# Patient Record
Sex: MALE | Race: WHITE | Employment: OTHER | URBAN - METROPOLITAN AREA
[De-identification: names, ages, dates, MRNs, and addresses within clinical notes are randomized per-mention and may not be internally consistent; named-entity substitution may affect disease eponyms.]

---
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
## 2021-03-24 ENCOUNTER — HOSPITAL ENCOUNTER (INPATIENT)
Age: 21
LOS: 6 days | Discharge: HOME | DRG: 885 | End: 2021-03-30
Attending: EMERGENCY MEDICINE | Admitting: PSYCHIATRY & NEUROLOGY
Payer: COMMERCIAL

## 2021-03-24 DIAGNOSIS — R45.851 SUICIDAL IDEATION: Primary | ICD-10-CM

## 2021-03-24 LAB
ACETAMINOPHEN LEVEL: <5 MCG/ML (ref 10–30)
ALBUMIN SERPL-MCNC: 4.7 G/DL (ref 3.5–5.2)
ALP BLD-CCNC: 113 U/L (ref 40–129)
ALT SERPL-CCNC: 46 U/L (ref 0–40)
AMPHETAMINE SCREEN, URINE: NOT DETECTED
ANION GAP SERPL CALCULATED.3IONS-SCNC: 11 MMOL/L (ref 7–16)
AST SERPL-CCNC: 38 U/L (ref 0–39)
BARBITURATE SCREEN URINE: NOT DETECTED
BASOPHILS ABSOLUTE: 0.03 E9/L (ref 0–0.2)
BASOPHILS RELATIVE PERCENT: 0.4 % (ref 0–2)
BENZODIAZEPINE SCREEN, URINE: NOT DETECTED
BILIRUB SERPL-MCNC: 0.3 MG/DL (ref 0–1.2)
BILIRUBIN URINE: NEGATIVE
BLOOD, URINE: NEGATIVE
BUN BLDV-MCNC: 8 MG/DL (ref 6–20)
CALCIUM SERPL-MCNC: 9.5 MG/DL (ref 8.6–10.2)
CANNABINOID SCREEN URINE: NOT DETECTED
CHLORIDE BLD-SCNC: 105 MMOL/L (ref 98–107)
CLARITY: CLEAR
CO2: 24 MMOL/L (ref 22–29)
COCAINE METABOLITE SCREEN URINE: NOT DETECTED
COLOR: YELLOW
CREAT SERPL-MCNC: 0.8 MG/DL (ref 0.7–1.2)
EOSINOPHILS ABSOLUTE: 0.19 E9/L (ref 0.05–0.5)
EOSINOPHILS RELATIVE PERCENT: 2.6 % (ref 0–6)
ETHANOL: <10 MG/DL (ref 0–0.08)
FENTANYL SCREEN, URINE: NOT DETECTED
GFR AFRICAN AMERICAN: >60
GFR NON-AFRICAN AMERICAN: >60 ML/MIN/1.73
GLUCOSE BLD-MCNC: 97 MG/DL (ref 74–99)
GLUCOSE URINE: NEGATIVE MG/DL
HCT VFR BLD CALC: 42.7 % (ref 37–54)
HEMOGLOBIN: 13.7 G/DL (ref 12.5–16.5)
IMMATURE GRANULOCYTES #: 0.03 E9/L
IMMATURE GRANULOCYTES %: 0.4 % (ref 0–5)
KETONES, URINE: NEGATIVE MG/DL
LEUKOCYTE ESTERASE, URINE: NEGATIVE
LYMPHOCYTES ABSOLUTE: 1.96 E9/L (ref 1.5–4)
LYMPHOCYTES RELATIVE PERCENT: 26.5 % (ref 20–42)
Lab: NORMAL
MCH RBC QN AUTO: 27 PG (ref 26–35)
MCHC RBC AUTO-ENTMCNC: 32.1 % (ref 32–34.5)
MCV RBC AUTO: 84.2 FL (ref 80–99.9)
METHADONE SCREEN, URINE: NOT DETECTED
MONOCYTES ABSOLUTE: 0.8 E9/L (ref 0.1–0.95)
MONOCYTES RELATIVE PERCENT: 10.8 % (ref 2–12)
NEUTROPHILS ABSOLUTE: 4.39 E9/L (ref 1.8–7.3)
NEUTROPHILS RELATIVE PERCENT: 59.3 % (ref 43–80)
NITRITE, URINE: NEGATIVE
OPIATE SCREEN URINE: NOT DETECTED
OXYCODONE URINE: NOT DETECTED
PDW BLD-RTO: 12.5 FL (ref 11.5–15)
PH UA: 7 (ref 5–9)
PHENCYCLIDINE SCREEN URINE: NOT DETECTED
PLATELET # BLD: 258 E9/L (ref 130–450)
PMV BLD AUTO: 9.6 FL (ref 7–12)
POTASSIUM SERPL-SCNC: 4.2 MMOL/L (ref 3.5–5)
PROTEIN UA: NEGATIVE MG/DL
RBC # BLD: 5.07 E12/L (ref 3.8–5.8)
SALICYLATE, SERUM: <0.3 MG/DL (ref 0–30)
SARS-COV-2, NAAT: NOT DETECTED
SODIUM BLD-SCNC: 140 MMOL/L (ref 132–146)
SPECIFIC GRAVITY UA: 1.01 (ref 1–1.03)
T4 TOTAL: 5.5 MCG/DL (ref 4.5–11.7)
TOTAL PROTEIN: 7.3 G/DL (ref 6.4–8.3)
TRICYCLIC ANTIDEPRESSANTS SCREEN SERUM: NEGATIVE NG/ML
TSH SERPL DL<=0.05 MIU/L-ACNC: 1.7 UIU/ML (ref 0.27–4.2)
UROBILINOGEN, URINE: 0.2 E.U./DL
WBC # BLD: 7.4 E9/L (ref 4.5–11.5)

## 2021-03-24 PROCEDURE — 1240000000 HC EMOTIONAL WELLNESS R&B

## 2021-03-24 PROCEDURE — 99284 EMERGENCY DEPT VISIT MOD MDM: CPT

## 2021-03-24 PROCEDURE — 84436 ASSAY OF TOTAL THYROXINE: CPT

## 2021-03-24 PROCEDURE — 80143 DRUG ASSAY ACETAMINOPHEN: CPT

## 2021-03-24 PROCEDURE — 80053 COMPREHEN METABOLIC PANEL: CPT

## 2021-03-24 PROCEDURE — 82077 ASSAY SPEC XCP UR&BREATH IA: CPT

## 2021-03-24 PROCEDURE — 87635 SARS-COV-2 COVID-19 AMP PRB: CPT

## 2021-03-24 PROCEDURE — 80179 DRUG ASSAY SALICYLATE: CPT

## 2021-03-24 PROCEDURE — 80307 DRUG TEST PRSMV CHEM ANLYZR: CPT

## 2021-03-24 PROCEDURE — 85025 COMPLETE CBC W/AUTO DIFF WBC: CPT

## 2021-03-24 PROCEDURE — 84443 ASSAY THYROID STIM HORMONE: CPT

## 2021-03-24 PROCEDURE — 93005 ELECTROCARDIOGRAM TRACING: CPT | Performed by: EMERGENCY MEDICINE

## 2021-03-24 PROCEDURE — 81003 URINALYSIS AUTO W/O SCOPE: CPT

## 2021-03-24 RX ORDER — HALOPERIDOL 5 MG/ML
5 INJECTION INTRAMUSCULAR EVERY 6 HOURS PRN
Status: DISCONTINUED | OUTPATIENT
Start: 2021-03-24 | End: 2021-03-30 | Stop reason: HOSPADM

## 2021-03-24 RX ORDER — HYDROXYZINE PAMOATE 50 MG/1
50 CAPSULE ORAL 3 TIMES DAILY PRN
Status: DISCONTINUED | OUTPATIENT
Start: 2021-03-24 | End: 2021-03-30 | Stop reason: HOSPADM

## 2021-03-24 RX ORDER — MAGNESIUM HYDROXIDE/ALUMINUM HYDROXICE/SIMETHICONE 120; 1200; 1200 MG/30ML; MG/30ML; MG/30ML
30 SUSPENSION ORAL PRN
Status: DISCONTINUED | OUTPATIENT
Start: 2021-03-24 | End: 2021-03-30 | Stop reason: HOSPADM

## 2021-03-24 RX ORDER — ACETAMINOPHEN 325 MG/1
650 TABLET ORAL EVERY 6 HOURS PRN
Status: DISCONTINUED | OUTPATIENT
Start: 2021-03-24 | End: 2021-03-30 | Stop reason: HOSPADM

## 2021-03-24 RX ORDER — TRAZODONE HYDROCHLORIDE 50 MG/1
50 TABLET ORAL NIGHTLY PRN
Status: DISCONTINUED | OUTPATIENT
Start: 2021-03-25 | End: 2021-03-30 | Stop reason: HOSPADM

## 2021-03-24 RX ORDER — HALOPERIDOL 5 MG
5 TABLET ORAL EVERY 6 HOURS PRN
Status: DISCONTINUED | OUTPATIENT
Start: 2021-03-24 | End: 2021-03-30 | Stop reason: HOSPADM

## 2021-03-24 ASSESSMENT — ENCOUNTER SYMPTOMS
VOMITING: 0
TROUBLE SWALLOWING: 0
SHORTNESS OF BREATH: 0
NAUSEA: 0
BLOOD IN STOOL: 0
DIARRHEA: 0
COUGH: 0
RHINORRHEA: 0
COLOR CHANGE: 0
ABDOMINAL PAIN: 0

## 2021-03-24 NOTE — ED NOTES
Bed: [facility]27A  Expected date:   Expected time:   Means of arrival: [facility]  Comments:  Rob Dunlap, [address]  03/24/21 0424

## 2021-03-24 NOTE — ED PROVIDER NOTES
ED PROVIDER NOTE    Chief Complaint   Patient presents with    Suicidal     SI with a plan to cut his wrist. Pt states he has the mind of a 2year old and no one understands him. HPI:  3/24/21,   Time: 5:15 PM EDT       Naseem Rosas is a 24 y.o. male presenting to the ED for suicidal ideation. Acute onset 1h ago after friend told him to go kill himself. Moderate in severity. Improving since onset but still present. Plan to cut wrists. No associated HI/AVH. No recent illness or injury. No drug/etoh use. Compliant with home psych meds. Chart review: hx of autism, OCD    Review of Systems:     Review of Systems   Constitutional: Negative for appetite change, chills and fever. HENT: Negative for congestion, rhinorrhea and trouble swallowing. Eyes: Negative for visual disturbance. Respiratory: Negative for cough and shortness of breath. Cardiovascular: Negative for chest pain and leg swelling. Gastrointestinal: Negative for abdominal pain, blood in stool, diarrhea, nausea and vomiting. Genitourinary: Negative for decreased urine volume, difficulty urinating, dysuria, frequency, hematuria and urgency. Musculoskeletal: Negative for myalgias, neck pain and neck stiffness. Skin: Negative for color change. Neurological: Negative for dizziness, syncope, weakness, light-headedness, numbness and headaches. Psychiatric/Behavioral: Positive for dysphoric mood and suicidal ideas. Negative for hallucinations, self-injury and sleep disturbance. --------------------------------------------- PAST HISTORY ---------------------------------------------  Past Medical History:   Past Medical History:   Diagnosis Date    Autism     OCD (obsessive compulsive disorder)        Past Surgical History:   No past surgical history on file.     Social History:   Social History     Socioeconomic History    Marital status: Single     Spouse name: Not on file    Number of children: Not on file    Years of education: Not on file    Highest education level: Not on file   Occupational History    Not on file   Social Needs    Financial resource strain: Not on file    Food insecurity     Worry: Not on file     Inability: Not on file    Transportation needs     Medical: Not on file     Non-medical: Not on file   Tobacco Use    Smoking status: Not on file   Substance and Sexual Activity    Alcohol use: Not on file    Drug use: Not on file    Sexual activity: Not on file   Lifestyle    Physical activity     Days per week: Not on file     Minutes per session: Not on file    Stress: Not on file   Relationships    Social connections     Talks on phone: Not on file     Gets together: Not on file     Attends [employer] service: Not on file     Active member of club or organization: Not on file     Attends meetings of clubs or organizations: Not on file     Relationship status: Not on file    Intimate partner violence     Fear of current or ex partner: Not on file     Emotionally abused: Not on file     Physically abused: Not on file     Forced sexual activity: Not on file   Other Topics Concern    Not on file   Social History Narrative    Not on file       Family History:   No family history on file. The patients home medications have been reviewed. Allergies:   No Known Allergies        ---------------------------------------------------PHYSICAL EXAM--------------------------------------    BP (!) 154/63   Pulse 88   Temp 98.4 °F (36.9 °C) (Temporal)   Resp 17   Wt 128 lb (58.1 kg)   SpO2 100%     Physical Exam  Vitals signs and nursing note reviewed. Constitutional:       General: He is not in acute distress. Appearance: He is not toxic-appearing. HENT:      Mouth/Throat:      Mouth: Mucous membranes are moist.   Eyes:      General: No scleral icterus. Extraocular Movements: Extraocular movements intact. Pupils: Pupils are equal, round, and reactive to light.    Neck: Musculoskeletal: Normal range of motion and neck supple. No neck rigidity or muscular tenderness. Cardiovascular:      Rate and Rhythm: Normal rate and regular rhythm. Pulses: Normal pulses. Heart sounds: Normal heart sounds. No murmur. Pulmonary:      Effort: Pulmonary effort is normal. No respiratory distress. Breath sounds: Normal breath sounds. No wheezing or rales. Abdominal:      General: There is no distension. Palpations: Abdomen is soft. Tenderness: There is no abdominal tenderness. There is no guarding or rebound. Musculoskeletal: Normal range of motion. General: No swelling or tenderness. Skin:     General: Skin is warm and dry. Neurological:      Mental Status: He is alert and oriented to person, place, and time. Comments: Strength 5/5 and sensation grossly intact to light touch and equal bilaterally throughout all extremities   Psychiatric:      Comments: Normal affect, calm, cooperative, appropriate, +SI, no HI/AVH, not responding to internal stimuli            -------------------------------------------------- RESULTS -------------------------------------------------  I have personally reviewed all laboratory and imaging results for this patient. Results are listed below. LABS:  Labs Reviewed   COMPREHENSIVE METABOLIC PANEL - Abnormal; Notable for the following components:       Result Value    ALT 46 (*)     All other components within normal limits   SERUM DRUG SCREEN - Abnormal; Notable for the following components:    Acetaminophen Level <5.0 (*)     All other components within normal limits   COVID-19, RAPID   CBC WITH AUTO DIFFERENTIAL   URINE DRUG SCREEN   URINALYSIS   TSH WITHOUT REFLEX   T4       EKG: This EKG is signed and interpreted by the EP.     Normal sinus rhythm, vent rate 74bpm, normal axis and intervals, no acute injury pattern, no prior EKG on file for comparison      ------------------------- NURSING NOTES AND VITALS REVIEWED ---------------------------   The nursing notes within the ED encounter and vital signs as below have been reviewed by myself. BP (!) 154/63   Pulse 88   Temp 98.4 °F (36.9 °C) (Temporal)   Resp 17   Wt 128 lb (58.1 kg)   SpO2 100%   Oxygen Saturation Interpretation: Normal    The patients available past medical records and past encounters were reviewed. ------------------------------ ED COURSE/MEDICAL DECISION MAKING----------------------  Medications   acetaminophen (TYLENOL) tablet 650 mg (has no administration in time range)   magnesium hydroxide (MILK OF MAGNESIA) 400 MG/5ML suspension 30 mL (has no administration in time range)   aluminum & magnesium hydroxide-simethicone (MAALOX) 200-200-20 MG/5ML suspension 30 mL (has no administration in time range)   hydrOXYzine (VISTARIL) capsule 50 mg (50 mg Oral Given 3/25/21 0202)   haloperidol lactate (HALDOL) injection 5 mg (has no administration in time range)     Or   haloperidol (HALDOL) tablet 5 mg (has no administration in time range)   traZODone (DESYREL) tablet 50 mg (has no administration in time range)     Consultations:             Social work; psychiatry    Counseling: The emergency provider has spoken with the patient and discussed todays results, in addition to providing specific details for the plan of care and counseling regarding the diagnosis and prognosis. Questions are answered at this time and they are agreeable with the plan. ED Course/Medical Decision Makin y.o. male here with suicidal ideation. Medically cleared for psych evaluation. Calm and cooperative throughout ED course. Persistent SI on reevaluation. [place] slipped and admitted to psychiatry for further management.       --------------------------------- IMPRESSION AND DISPOSITION ---------------------------------    IMPRESSION  1.  Suicidal ideation        DISPOSITION  Disposition: Admit to mental health unit - medically cleared for admission  Patient condition is stable    NOTE: This report was transcribed using voice recognition software.  Every effort was made to ensure accuracy; however, inadvertent computerized transcription errors may be present    Abida Johnson MD  Attending Emergency Physician          Abida Johnson MD  03/25/21 9723

## 2021-03-25 PROBLEM — F33.2 SEVERE EPISODE OF RECURRENT MAJOR DEPRESSIVE DISORDER, WITHOUT PSYCHOTIC FEATURES (HCC): Status: ACTIVE | Noted: 2021-03-25

## 2021-03-25 LAB
CHOLESTEROL, TOTAL: 110 MG/DL (ref 0–199)
EKG ATRIAL RATE: 74 BPM
EKG P AXIS: 51 DEGREES
EKG P-R INTERVAL: 128 MS
EKG Q-T INTERVAL: 350 MS
EKG QRS DURATION: 92 MS
EKG QTC CALCULATION (BAZETT): 388 MS
EKG R AXIS: 85 DEGREES
EKG T AXIS: 57 DEGREES
EKG VENTRICULAR RATE: 74 BPM
HBA1C MFR BLD: 5.4 % (ref 4–5.6)
HDLC SERPL-MCNC: 34 MG/DL
LDL CHOLESTEROL CALCULATED: 55 MG/DL (ref 0–99)
TRIGL SERPL-MCNC: 107 MG/DL (ref 0–149)
VLDLC SERPL CALC-MCNC: 21 MG/DL

## 2021-03-25 PROCEDURE — 80061 LIPID PANEL: CPT

## 2021-03-25 PROCEDURE — 36415 COLL VENOUS BLD VENIPUNCTURE: CPT

## 2021-03-25 PROCEDURE — 93010 ELECTROCARDIOGRAM REPORT: CPT | Performed by: INTERNAL MEDICINE

## 2021-03-25 PROCEDURE — 1240000000 HC EMOTIONAL WELLNESS R&B

## 2021-03-25 PROCEDURE — 83036 HEMOGLOBIN GLYCOSYLATED A1C: CPT

## 2021-03-25 PROCEDURE — 6370000000 HC RX 637 (ALT 250 FOR IP): Performed by: EMERGENCY MEDICINE

## 2021-03-25 PROCEDURE — 99222 1ST HOSP IP/OBS MODERATE 55: CPT | Performed by: NURSE PRACTITIONER

## 2021-03-25 RX ADMIN — HYDROXYZINE PAMOATE 50 MG: 50 CAPSULE ORAL at 02:02

## 2021-03-25 RX ADMIN — HYDROXYZINE PAMOATE 50 MG: 50 CAPSULE ORAL at 13:27

## 2021-03-25 ASSESSMENT — LIFESTYLE VARIABLES
HISTORY_ALCOHOL_USE: YES
HISTORY_ALCOHOL_USE: NO

## 2021-03-25 ASSESSMENT — PATIENT HEALTH QUESTIONNAIRE - PHQ9
SUM OF ALL RESPONSES TO PHQ QUESTIONS 1-9: 14
SUM OF ALL RESPONSES TO PHQ QUESTIONS 1-9: 8

## 2021-03-25 ASSESSMENT — SLEEP AND FATIGUE QUESTIONNAIRES
DO YOU HAVE DIFFICULTY SLEEPING: NO
AVERAGE NUMBER OF SLEEP HOURS: 7
AVERAGE NUMBER OF SLEEP HOURS: 8

## 2021-03-25 ASSESSMENT — PAIN SCALES - GENERAL
PAINLEVEL_OUTOF10: 0
PAINLEVEL_OUTOF10: 0

## 2021-03-25 NOTE — PLAN OF CARE
Problem: Depressive Behavior With or Without Suicide Precautions:  Goal: Able to verbalize acceptance of life and situations over which he or she has no control  Description: Able to verbalize acceptance of life and situations over which he or she has no control  3/25/2021 1107 by Edinson Wiseman RN  Outcome: Ongoing     Problem: Depressive Behavior With or Without Suicide Precautions:  Goal: Able to verbalize and/or display a decrease in depressive symptoms  Description: Able to verbalize and/or display a decrease in depressive symptoms  3/25/2021 1107 by Edinson Wiseman RN  Outcome: Ongoing     Problem: Depressive Behavior With or Without Suicide Precautions:  Goal: Ability to disclose and discuss suicidal ideas will improve  Description: Ability to disclose and discuss suicidal ideas will improve  3/25/2021 1107 by Edinson Wiseman RN  Outcome: Ongoing     Problem: Depressive Behavior With or Without Suicide Precautions:  Goal: Able to verbalize support systems  Description: Able to verbalize support systems  3/25/2021 1107 by Edinson Wiseman RN  Outcome: Ongoing     Problem: Depressive Behavior With or Without Suicide Precautions:  Goal: Absence of self-harm  Description: Absence of self-harm  3/25/2021 1107 by Edinson Wiseman RN  Outcome: Ongoing     Patient is pleasant and cooperative. Eye contact is poor. Patient denies homicidal ideations and hallucinations. Patient endorses suicidal ideations with no plan. Patient contracts for safety. Patient is isolative to self. He is in control of his behavior. Will continue to monitor and support.

## 2021-03-25 NOTE — CARE COORDINATION
Biopsychosocial Assessment Note    Social work met with patient to complete the biopsychosocial assessment and CSSR-S. Mental Status Exam: Oriented x4. Facial expressions brightened, affect congruent, alert, mood euphoric, motor activity repetitive acts, interview behavior cooperative, attention distractible, thought processes tangential, thought content preoccupations, poor remote, poor insight and judgement. Pt is a poor historian. Pt confirms SI. Pt denied HI. Pt reported seeing a shadow once in abandoned hospital, but not at present. Chief Complaint: Robert Mckinney is a 25 yo male who presents via ambulance, pt is not on a pink slip, reports he got into an argument with his sister who told him to go kill himself and he was so hurt and angered by this that he determined that he would kill himself by cutting his wrists. \"    Patient Report: Pt reports getting into an argument with his friend who told Pt to kill himself. Pt had a knife and was going to cut his wrists for purposes of killing himself, when another friend intervened and took knife from Pt and told him to call 911 which Pt did. Pt current reports suicidal thoughts, but no plan at present or intention of acting on thoughts. Pt reports previous suicidal thoughts on February 24 when Pt wanted to jump in front of rail road tracks and went to [facility]. Pt further reported he attempted suicide 2 years ago with intentions of stabbing himself with a knife. A friend called and Pt stopped. Pt reports superficial cut. Pt reported drinking every other year, yearly, and when he was bored/thristy only happening ever year. Pt reported drinking one bottle of vodka when drinking, but later reported drinking drinks with only 1 percent alcohol. Pt denies AVH, but reported seeing a shadow at an abandoned hospital [place] years ago. Pt reports his adopted parents kicked him out of the [facility] for [employer] and is currently homeless. Gender  [x] Male [] Female [] Transgender  [] Other    Sexual Orientation    [x] Heterosexual [] Homosexual [] Bisexual [] Other    Suicidal Ideation  [x] Reports [] Denies  Pt reports SI at present, but does not have plan or intent. Homicidal Ideation  [] Reports [x] Denies      Hallucinations/Delusions (Specify type)  [] Reports [x] Denies   Pt denies AVH, but reported seeing a shadow at an abandoned hospital in [place] years ago. Substance Use/Alcohol Use/Addiction  [] Reports [x] Denies   Pt was poor historian. Pt reported drinking every other year, yearly, and when he was bored/thristy only happening ever year. Pt reported drinking one bottle of vodka when drinking, but later reported drinking drinks with only 1 percent alcohol. Collateral reported marijuana use in unknown amounts. Trauma History  [x] Reports [] Denies   Pt reports witnessing his adopted mother completing suicide through overdose of pills when Pt was younger. Collateral Contact (AUSTEN signed)  Name:  Cristal Garvin  Relationship: Friend  Number: [phone]    Collateral Information: Juan Diego Jones reports that friend of theirs told Pt to kill self and Pt was thinking about the statement. She did not think Pt attempted suicide, but she told him to go to the hospital. She reported Pt walked to the hospital. She reported Pt had frequent suicidal thoughts, but has never attempted. She reported Pt may have missed taking his medications on occation, but believed Pt took medications regularly. She was unsure of exact substance use, but reported Pt would drink alcohol \"not often\" and typically a \"shot of something. \" She further reported Pt would smoke marijuana \"not often anymore\" in unknown amounts. She reported that Pt is current homeless living on the streets confirming his family does not want anything to do with Pt, believing since death of adopted mother, the adopted father did not want him.      Access to Weapons per Collateral Contact: [] Reports [x] Denies   Pt reports he does not have access to weapons, but spoke about thinking about getting a switch blade for protection. Buffy Mcclelland denied access to weapons. Follow up provider preference: Reports Dr. SALINASMIVALENTE [facility] set up appointments, but did not remember were or when. Plan for discharge (where they live can they return):  To be determined

## 2021-03-25 NOTE — H&P
Department of Psychiatry  History and Physical - Adult     CHIEF COMPLAINT:  \"I am feeling suicidal.\"     Patient was seen after discussing with the treatment team and reviewing the chart    CIRCUMSTANCES OF ADMISSION:   Pt is a 23 yo male who presents via ambulance, pt is not on a pink slip, reports he got into an argument with his sister who told him to go kill himself and he was so hurt and angered by this that he determined that he would kill himself by cutting his wrists. Reports he has been dealing with depression and anxiety for past 7-10 days and has been thinking about suicide and after argument today he became convinced this was the answer. SW explored level of suicidal ideation and intent, when pressed pt becomes loud and insistent: \"I WILL kill myself. \" Apparent for interview that pt deals with cognitive issues which he reports as ADHD. HISTORY OF PRESENT ILLNESS:      The patient is a 24 y.o. male with significant past history of one inpatient psychiatric hospitalization in February 2021 at [facility] for depression and suicidal ideation ADHD and autism. Upon assessment today the patient continues to endorse suicidal ideation. He has a nonspecific plan. Tells me that he had a disagreement with a friend who said some hurtful things to him and made him want to kill himself, he does not want to hurt anyone else. He states that he still feels this way. He currently denies auditory or visual hallucinations, there are no overt or covert signs of psychosis or paranoia. Patient is alert and oriented to person place time and situation he is able to easily recount events leading to his hospitalization. States that he is now homeless and has nowhere to go. Tells me that he does not have a good relationship with his parents. He states that he receives disability, but is also looking for a job so that he has enough money to live off of.   He is expressing feelings of helplessness and person place time and situation and easily able to recount events leading to his hospitalization. Past Medical History:        Diagnosis Date    Autism     OCD (obsessive compulsive disorder)        Medications Prior to Admission:   Medications Prior to Admission: lisdexamfetamine (VYVANSE) 70 MG capsule, Take 70 mg by mouth every morning. Past Surgical History:    No past surgical history on file. Allergies:   Patient has no known allergies. Family History  No family history on file. EXAMINATION:    REVIEW OF SYSTEMS:    ROS:  [x] All negative/unchanged except if checked.  Explain positive(checked items) below:  [] Constitutional  [] Eyes  [] Ear/Nose/Mouth/Throat  [] Respiratory  [] CV  [] GI  []   [] Musculoskeletal  [] Skin/Breast  [] Neurological  [] Endocrine  [] Heme/Lymph  [] Allergic/Immunologic    Explanation:     Vitals:  BP (!) 117/59   Pulse 78   Temp 98.5 °F (36.9 °C) (Temporal)   Resp 18   Ht 5' 4\" (1.626 m)   Wt 130 lb (59 kg)   SpO2 99%   BMI 22.31 kg/m²      Physical Examination:   Head: x  Atraumatic: x normocephalic  Skin and Mucosa        Moist x  Dry   Pale  x Normal   Neck:  Thyroid  Palpable   x  Not palpable   venus distention   adenopathy   Chest: x Clear   Rhonchi     Wheezing   CV:  xS1   xS2    xNo murmer   Abdomen:  x  Soft    Tender    Viceromegaly   Extremities:  x No Edema     Edema     Cranial Nerves Examination:   CN II:   xPupils are reactive to light  Pupils are non reactive to light  CN III, IV, VI:  xNo eye deviation    No diplopia or ptosis   CN V:    xFacial Sensation is intact     Facial Sensation is not intact   CN IIIV:   x Hearing is normal to rubbing fingers   CN IX, X:     xNormal gag reflex and phonation   CN XI:   xShoulder shrug and neck rotation is normal  CNXII:    xTongue is midline no deviation or atrophy      DIAGNOSIS:    Severe episode of recurrent major depressive disorder, without psychotic features, mixed(HCC    LABS: REVIEWED

## 2021-03-25 NOTE — PLAN OF CARE
Problem: Suicide risk  Goal: Provide patient with safe environment  Description: Provide patient with safe environment  Outcome: Met This Shift     Problem: Depressive Behavior With or Without Suicide Precautions:  Goal: Able to verbalize acceptance of life and situations over which he or she has no control  Description: Able to verbalize acceptance of life and situations over which he or she has no control  Outcome: Ongoing  Goal: Able to verbalize and/or display a decrease in depressive symptoms  Description: Able to verbalize and/or display a decrease in depressive symptoms  Outcome: Ongoing  Goal: Ability to disclose and discuss suicidal ideas will improve  Description: Ability to disclose and discuss suicidal ideas will improve  Outcome: Ongoing  Goal: Able to verbalize support systems  Description: Able to verbalize support systems  Outcome: Ongoing  Goal: Absence of self-harm  Description: Absence of self-harm  Outcome: Ongoing  Goal: Participates in care planning  Description: Participates in care planning  Outcome: Ongoing

## 2021-03-25 NOTE — PROGRESS NOTES
`[facility]  Admission Note    Patient is a 24 y.o. male admitted from the [facility], he is pleasant and cooperative. Patient states he was in an argument with a friend and she told him to go kill himself. Patient reports he then felt suicidal with a plan to cut his wrists, he called 911 for help and was brought to the ER. Patient is sad and worried, he states he was recently laid off and evicted from his apartment, he has been staying with friends. Patient contracts for safety, states, \"I feel better here knowing that I will be checked on and I can get help. \" Denies HI/AVH. Patient reports he is autistic, has ADHD, and OCD. Patient reports he was prescribed vyvanse and has not taken it in at least 3 months. Admission Type:   Admission Type: Involuntary    Reason for admission:  Reason for Admission: \"My friend told me to kill myself and I was thinking about what my family has done to me. \"    PATIENT STRENGTHS:  Strengths: Communication, Motivated, Positive Support    Patient Strengths and Limitations:  Limitations: Tendency to isolate self    Addictive Behavior:   Addictive Behavior  In the past 3 months, have you felt or has someone told you that you have a problem with:  : None  Do you have a history of Chemical Use?: No  Do you have a history of Alcohol Use?: No  Do you have a history of Street Drug Abuse?: No  Histroy of Prescripton Drug Abuse?: No    Medical Problems:   Past Medical History:   Diagnosis Date    Autism     OCD (obsessive compulsive disorder)        Status EXAM:  Status and Exam  Normal: No  Facial Expression: Sad, Worried  Affect: Congruent  Level of Consciousness: Alert  Mood:Normal: No  Mood: Depressed, Anxious, Sad  Motor Activity:Normal: Yes  Interview Behavior: Cooperative  Preception: [place] to Person, [place] to Time, [place] to Place, [place] to Situation  Attention:Normal: No  Attention: Distractible  Thought Processes: Tangential  Thought Content:Normal: No  Thought Content: Preoccupations  Hallucinations: None  Delusions: No  Memory:Normal: Yes  Insight and Judgment: No  Insight and Judgment: Poor Judgment, Poor Insight  Present Suicidal Ideation: Yes  Present Homicidal Ideation: No    Tobacco Screening:  Practical Counseling, on admission, desiree X, if applicable and completed (first 3 are required if patient doesn't refuse): (x )  Recognizing danger situations (included triggers and roadblocks)                    ( x)  Coping skills (new ways to manage stress, exercise, relaxation techniques, changing routine, distraction)                                                           ( x)  Basic information about quitting (benefits of quitting, techniques in how to quit, available resources  ( ) Referral for counseling faxed to [facility]                                           ( ) Patient refused counseling  ( ) Patient has not smoked in the last 30 days    Metabolic Screening:    No results found for: LABA1C    No results found for: CHOL  No results found for: TRIG  No results found for: HDL  No components found for: LDLCAL  No results found for: LABVLDL      Body mass index is 22.31 kg/m². BP Readings from Last 2 Encounters:   03/25/21 (!) 117/59   01/03/20 110/68           Pt admitted with followings belongings:  Dentures: None  Vision - Corrective Lenses: None  Hearing Aid: None  Jewelry: None  Clothing: Shirt, Pants, Socks, Footwear  Were All Patient Medications Collected?: Not Applicable  Other Valuables: Cell phone     Valuables sent home with n/a. Valuables placed in safe in security envelope, number:  n/a. Patient's home medications were n/a. Patient oriented to surroundings and program expectations and copy of patient rights given. Received admission packet:  yes. Consents reviewed, signed yes. Patient verbalize understanding:  yes. Patient education on precautions: yes.                   Toi Martinez RN

## 2021-03-25 NOTE — GROUP NOTE
Group Therapy Note    Date: 3/25/2021    Group Start Time: 1350  Group End Time: 8555  Group Topic: Cognitive Skills    [facility] 2    [address]        Group Therapy Note    Attendees: 8         Patient's Goal:  To gain insight into the Grieving Process by interacting with the group    Notes: Pt was left group home though the session, but was able to express feelings of expressing grief in healthy ways and when to seek professional help. Pt was given support and feedback. Status After Intervention:  Improved    Participation Level:  Active Listener    Participation Quality: Sharing      Speech:  normal      Thought Process/Content: Linear      Affective Functioning: Exaggerated      Mood: euphoric      Level of consciousness:  Alert      Response to Learning: Able to retain information      Endings: None Reported    Modes of Intervention: Education      Discipline Responsible: /Counselor      Signature:  [address]

## 2021-03-25 NOTE — ED NOTES
Emergency Department [facility] OF [facility] Biopsychosocial Assessment Note    Chief Complaint: Pt is a 25 yo male who presents via ambulance, pt is not on a pink slip, reports he got into an argument with his sister who told him to go kill himself and he was so hurt and angered by this that he determined that he would kill himself by cutting his wrists. Reports he has been dealing with depression and anxiety for past 7-10 days and has been thinking about suicide and after argument today he became convinced this was the answer. SW explored level of suicidal ideation and intent, when pressed pt becomes loud and insistent: \"I WILL kill myself. \" Apparent for interview that pt deals with cognitive issues which he reports as ADHD. MSE: Alert, oriented x4, mood neutral, affect appropriate, congruent, behavior is cooperative, calm, no signs of agitation, thought form concrete, thought content clear, denies aud/visual hallucinations, delusions or paranoia, one noted in interview. Clinical Summary/History: Reports not open in community for services, reports a recent psych admission at [facility] [place], states he was prescribed Abilify from [facility], denies hospitalizations prior to 18. Gender  [x] Male [] Female [] Transgender  [] Other    Sexual Orientation    [x] Heterosexual [] Homosexual [] Bisexual [] Other    Suicidal Behavioral: CSSR-S Complete. [x] Reports:    [x] Past [x] Present   [] Denies    Homicidal/ Violent Behavior  [] Reports:   [] Past [] Present   [x] Denies     Hallucinations/Delusions   [] Reports:   [x] Denies     Substance Use/Alcohol Use/Addiction: SBIRT Screen Complete. [] Reports:   [x] Denies     Trauma History  [] Reports:  [x] Denies     Collateral Information:       Level of Care/Disposition Plan: Discussed with Dr Gigi Phelps, pt insistent on suicidal ideation with plan to cut wrists. In-patient for safety and assessment.    [] Home:   [] Outpatient Provider:   [] Crisis Unit:   [x] Inpatient Psychiatric Unit:  [] Other:        Keyla Young, MSLASHAY, LSW  03/24/21 [address] [facility] [employer], [employer]  03/24/21 [address] [place] [address] [facility] [employer], [employer]  03/24/21 [facility] [employer], [employer]  03/24/21 0048

## 2021-03-25 NOTE — PROGRESS NOTES
[facility]  Initial Interdisciplinary Treatment Plan NOTE    Review Date & Time: 3/25/2021 0900    Patient was in treatment team    Admission Type:   Admission Type: Involuntary    Reason for admission:  Reason for Admission: \"My friend told me to kill myself and I was thinking about what my family has done to me. \"      Estimated Length of Stay Update:  1-3  Estimated Discharge Date Update: 3/30/2021    PATIENT STRENGTHS:  Patient Strengths Strengths: Communication  Patient Strengths and Limitations:Limitations: Perceives need for assistance with self-care, Difficult relationships / poor social skills, Multiple barriers to leisure interests, Limited education -> difficulty reading or writing, Difficulty problem solving/relies on others to help solve problems  Addictive Behavior:Addictive Behavior  In the past 3 months, have you felt or has someone told you that you have a problem with:  : None  Do you have a history of Chemical Use?: No  Do you have a history of Alcohol Use?: Yes  Do you have a history of Street Drug Abuse?: No  Histroy of Prescripton Drug Abuse?: No  Medical Problems:  Past Medical History:   Diagnosis Date    Autism     OCD (obsessive compulsive disorder)        EDUCATION:   Learner Progress Toward Treatment Goals: Reviewed results and recommendations of this team    Method: Small group    Outcome: Verbalized understanding    PATIENT GOALS: None at this time    PLAN/TREATMENT RECOMMENDATIONS UPDATE: Encourage group attendance and participation. Take medications as prescribed. GOALS UPDATE:   Time frame for Short-Term Goals: Prior to discharge.     Nely Zambrano RN

## 2021-03-25 NOTE — PROGRESS NOTES
Patient in bed with eyes closed. No prns administered at this time. No signs or symptoms of distress or discomfort. Will continue to observe and provide support.

## 2021-03-26 PROBLEM — F84.0 AUTISM SPECTRUM DISORDER: Status: ACTIVE | Noted: 2021-03-26

## 2021-03-26 PROBLEM — R41.83 BORDERLINE INTELLECTUAL FUNCTIONING: Status: ACTIVE | Noted: 2021-03-26

## 2021-03-26 PROCEDURE — 6370000000 HC RX 637 (ALT 250 FOR IP): Performed by: NURSE PRACTITIONER

## 2021-03-26 PROCEDURE — 99231 SBSQ HOSP IP/OBS SF/LOW 25: CPT | Performed by: NURSE PRACTITIONER

## 2021-03-26 PROCEDURE — 1240000000 HC EMOTIONAL WELLNESS R&B

## 2021-03-26 PROCEDURE — 6370000000 HC RX 637 (ALT 250 FOR IP): Performed by: EMERGENCY MEDICINE

## 2021-03-26 RX ORDER — BUPROPION HYDROCHLORIDE 150 MG/1
150 TABLET ORAL DAILY
Status: DISCONTINUED | OUTPATIENT
Start: 2021-03-26 | End: 2021-03-30 | Stop reason: HOSPADM

## 2021-03-26 RX ORDER — ARIPIPRAZOLE 10 MG/1
10 TABLET ORAL DAILY
Status: DISCONTINUED | OUTPATIENT
Start: 2021-03-26 | End: 2021-03-30 | Stop reason: HOSPADM

## 2021-03-26 RX ADMIN — TRAZODONE HYDROCHLORIDE 50 MG: 50 TABLET ORAL at 20:45

## 2021-03-26 RX ADMIN — BUPROPION HYDROCHLORIDE 150 MG: 150 TABLET, EXTENDED RELEASE ORAL at 12:07

## 2021-03-26 RX ADMIN — ARIPIPRAZOLE 10 MG: 10 TABLET ORAL at 12:07

## 2021-03-26 RX ADMIN — HALOPERIDOL 5 MG: 5 TABLET ORAL at 20:45

## 2021-03-26 NOTE — PROGRESS NOTES
Patient is pleasant and cooperative. He denies homicidal ideations and hallucinations. Patient states that he is still having suicidal ideations with no plan. Patient states these feelings are lessening. He contracts for safety. Patient is in control of his behavior and social with select peers. Will continue to monitor.

## 2021-03-26 NOTE — GROUP NOTE
Group Therapy Note    Date: 3/26/2021    Group Start Time: 1000  Group End Time: 1745  Group Topic: Psychoeducation    [facility]    Sheyla Robles, CTRS        Group Therapy Note    Number of participants: 8  Type of group: Psychoeducation  Mode of intervention: Education, Support, Socialization, Exploration, Clarifying, and Problem-solving  Topic: Gratitude  Objective: Pt will identify 3 ways to implement an attitude of gratitude in recovery. Patient's Goal:  \"Not to think about suicide\"     Notes:  Pt interactive during group sharing 3 ways to implement an attitude of gratitude in recovery. Pt gave support and feedback to others. Status After Intervention:  Improved    Participation Level:  Active Listener and Interactive    Participation Quality: Appropriate, Attentive, Sharing and Supportive      Speech:  normal      Thought Process/Content: Logical      Affective Functioning: Congruent      Mood: euthymic      Level of consciousness:  Alert, Oriented x4 and Attentive      Response to Learning: Able to verbalize current knowledge/experience, Able to verbalize/acknowledge new learning, Able to retain information, Capable of insight, Able to change behavior and Progressing to goal      Endings: None Reported    Modes of Intervention: Education, Support, Socialization, Exploration, Clarifying and Problem-solving

## 2021-03-26 NOTE — CARE COORDINATION
Pt continues to endorse suicidal ideation with no plan. Pt rates anxiety and depression 2/10. Pt is preoccupied on what other have said to him. Pt stated people have told him \"to kill himself. \" Pt presented to the desk crying and saying his anxiety is a 9/10 and his suicidal thought increased. Pt's sleep is decent, pt slept 6 hours last night. Pt attending groups. Discharge plan is for this pt to step-down to [facility]. From [facility] the pt may be appropriate for a group home. Sw will discuss this with staff in treatment team Monday. Collateral received from pt's friend Mami Marco A (See note on 3/25).

## 2021-03-26 NOTE — PROGRESS NOTES
Pt is pleasant and calm to talk to. Pt anxiety has come down since this morning, he stated his anxiety is at 2%. Pt stated talking with staff and medication helped him. Pt brightens with conversations.

## 2021-03-26 NOTE — PLAN OF CARE
Problem: Suicide risk  Goal: Provide patient with safe environment  Description: Provide patient with safe environment  Outcome: Met This Shift     Problem: Depressive Behavior With or Without Suicide Precautions:  Goal: Able to verbalize acceptance of life and situations over which he or she has no control  Description: Able to verbalize acceptance of life and situations over which he or she has no control  Outcome: Ongoing  Goal: Able to verbalize and/or display a decrease in depressive symptoms  Description: Able to verbalize and/or display a decrease in depressive symptoms  Outcome: Ongoing  Goal: Able to verbalize support systems  Description: Able to verbalize support systems  Outcome: Ongoing      Pt denies HI. Pt denies hallucinations and paranoia. Pt states he has SI with no plan. During morning assessment pt stated anxiety and depression 2/10. Pt is preoccupied on what other have said to him. Pt stated people have told him \"to kill himself. \" Pt presented to the desk crying and saying his anxiety is a 9/10 and his suicidal thought increased. Medical staff redirect and re assured pt safety. Staff administered Vistaril 50mg. Pt has calmed down and is social. Pt is clam and cooperative with staff. Pt is A&Ox4.  Pt observed eating breakfast.

## 2021-03-26 NOTE — PROGRESS NOTES
BEHAVIORAL HEALTH FOLLOW-UP NOTE     3/26/2021     Patient was seen and examined in person, Chart reviewed   Patient's case discussed with staff/team    Chief Complaint: \"I am feeling less suicidal.\"    Interim History:   She is observed in the day room. He is social with select peers, he is attending groups and taking his medications. There have been no behavioral outburst.  He remains somewhat blunted and depressed. Tells me this morning that he is feeling less suicidal.  He denies auditory or visual hallucinations and is devoid of homicidal ideation intent or plan. He tells me that he is homeless. Appetite:   [x] Normal/Unchanged  [] Increased  [] Decreased      Sleep:       [x] Normal/Unchanged  [] Fair       [] Poor              Energy:    [x] Normal/Unchanged  [] Increased  [] Decreased        SI [x] Present  [] Absent    HI  []Present  [x] Absent     Aggression:  [] yes  [x] no    Patient is [x] able  [] unable to CONTRACT FOR SAFETY     PAST MEDICAL/PSYCHIATRIC HISTORY:   Past Medical History:   Diagnosis Date    Autism     OCD (obsessive compulsive disorder)        FAMILY/SOCIAL HISTORY:  No family history on file.   Social History     Socioeconomic History    Marital status: Single     Spouse name: Not on file    Number of children: Not on file    Years of education: Not on file    Highest education level: Not on file   Occupational History    Not on file   Social Needs    Financial resource strain: Not on file    Food insecurity     Worry: Not on file     Inability: Not on file    Transportation needs     Medical: Not on file     Non-medical: Not on file   Tobacco Use    Smoking status: Light Tobacco Smoker    Smokeless tobacco: Never Used   Substance and Sexual Activity    Alcohol use: Not Currently     Frequency: Monthly or less     Drinks per session: Patient refused     Binge frequency: Patient refused    Drug use: Never    Sexual activity: Not on file   Lifestyle    Physical activity     Days per week: Not on file     Minutes per session: Not on file    Stress: Not on file   Relationships    Social connections     Talks on phone: Not on file     Gets together: Not on file     Attends Shinto service: Not on file     Active member of club or organization: Not on file     Attends meetings of clubs or organizations: Not on file     Relationship status: Not on file    Intimate partner violence     Fear of current or ex partner: Not on file     Emotionally abused: Not on file     Physically abused: Not on file     Forced sexual activity: Not on file   Other Topics Concern    Not on file   Social History Narrative    Not on file           ROS:  [x] All negative/unchanged except if checked.  Explain positive(checked items) below:  [] Constitutional  [] Eyes  [] Ear/Nose/Mouth/Throat  [] Respiratory  [] CV  [] GI  []   [] Musculoskeletal  [] Skin/Breast  [] Neurological  [] Endocrine  [] Heme/Lymph  [] Allergic/Immunologic    Explanation:     MEDICATIONS:    Current Facility-Administered Medications:     acetaminophen (TYLENOL) tablet 650 mg, 650 mg, Oral, Q6H PRN, Gaby Olguin MD    magnesium hydroxide (MILK OF MAGNESIA) 400 MG/5ML suspension 30 mL, 30 mL, Oral, Daily PRN, Gaby Olguin MD    aluminum & magnesium hydroxide-simethicone (MAALOX) 200-200-20 MG/5ML suspension 30 mL, 30 mL, Oral, PRN, Gaby Olguin MD    hydrOXYzine (VISTARIL) capsule 50 mg, 50 mg, Oral, TID PRN, Gaby Olguin MD, 50 mg at 03/25/21 1327    haloperidol lactate (HALDOL) injection 5 mg, 5 mg, Intramuscular, Q6H PRN **OR** haloperidol (HALDOL) tablet 5 mg, 5 mg, Oral, Q6H PRN, Gaby Olguin MD    traZODone (DESYREL) tablet 50 mg, 50 mg, Oral, Nightly PRN, Gaby Olguin MD      Examination:  /64   Pulse 77   Temp 98.8 °F (37.1 °C) (Temporal)   Resp 18   Ht 5' 4\" (1.626 m)   Wt 130 lb (59 kg)   SpO2 99%   BMI 22.31 kg/m²   Gait - steady  Medication side effects(SE): none reported    Mental Status educated that the outcome of treatment will depend on the medication compliance as directed by the prescribers along with regular follow-up, compliance with the labs and other work-up, as clinically indicated. Wellbutrin  mg daily for depression and anxiety  Abilify 10 mg daily      Collateral information: followed by social work  CD evaluation  Encourage patient to attend group and other milieu activities.   Discharge planning discussed with the patient and treatment team.    PSYCHOTHERAPY/COUNSELING:  [x] Therapeutic interview  [x] Supportive  [] CBT  [] Ongoing  [] Other    [x] Patient continues to need, on a daily basis, active treatment furnished directly by or requiring the supervision of inpatient psychiatric personnel      Anticipated Length of stay: 3 - 5 days based on stability            Electronically signed by SAADIA Tavares CNP on 3/26/2021 at 11:27 AM

## 2021-03-26 NOTE — GROUP NOTE
Group Therapy Note    Date: 3/25/2021    Group Start Time: 2000  Group End Time: 2030  Group Topic: Relaxation    [facility]  Kaila Meek, RN        Group Therapy Note    Attendees: 10/15       Signature:  Elsa Khanna RN

## 2021-03-27 PROCEDURE — 6370000000 HC RX 637 (ALT 250 FOR IP): Performed by: NURSE PRACTITIONER

## 2021-03-27 PROCEDURE — 6370000000 HC RX 637 (ALT 250 FOR IP): Performed by: EMERGENCY MEDICINE

## 2021-03-27 PROCEDURE — 99231 SBSQ HOSP IP/OBS SF/LOW 25: CPT | Performed by: NURSE PRACTITIONER

## 2021-03-27 PROCEDURE — 1240000000 HC EMOTIONAL WELLNESS R&B

## 2021-03-27 RX ADMIN — ARIPIPRAZOLE 10 MG: 10 TABLET ORAL at 09:30

## 2021-03-27 RX ADMIN — HALOPERIDOL 5 MG: 5 TABLET ORAL at 20:35

## 2021-03-27 RX ADMIN — TRAZODONE HYDROCHLORIDE 50 MG: 50 TABLET ORAL at 20:34

## 2021-03-27 RX ADMIN — BUPROPION HYDROCHLORIDE 150 MG: 150 TABLET, EXTENDED RELEASE ORAL at 09:29

## 2021-03-27 RX ADMIN — MAGNESIUM HYDROXIDE/ALUMINUM HYDROXICE/SIMETHICONE 30 ML: 120; 1200; 1200 SUSPENSION ORAL at 20:34

## 2021-03-27 NOTE — PROGRESS NOTES
Currently     Frequency: Monthly or less     Drinks per session: Patient refused     Binge frequency: Patient refused    Drug use: Never    Sexual activity: Not on file   Lifestyle    Physical activity     Days per week: Not on file     Minutes per session: Not on file    Stress: Not on file   Relationships    Social connections     Talks on phone: Not on file     Gets together: Not on file     Attends Orthodox service: Not on file     Active member of club or organization: Not on file     Attends meetings of clubs or organizations: Not on file     Relationship status: Not on file    Intimate partner violence     Fear of current or ex partner: Not on file     Emotionally abused: Not on file     Physically abused: Not on file     Forced sexual activity: Not on file   Other Topics Concern    Not on file   Social History Narrative    Not on file           ROS:  [x] All negative/unchanged except if checked.  Explain positive(checked items) below:  [] Constitutional  [] Eyes  [] Ear/Nose/Mouth/Throat  [] Respiratory  [] CV  [] GI  []   [] Musculoskeletal  [] Skin/Breast  [] Neurological  [] Endocrine  [] Heme/Lymph  [] Allergic/Immunologic    Explanation:     MEDICATIONS:    Current Facility-Administered Medications:     benzocaine-menthol (CEPACOL SORE THROAT) lozenge 1 lozenge, 1 lozenge, Oral, Q2H PRN, Deirdre Liz, APRN - CNP    buPROPion (WELLBUTRIN XL) extended release tablet 150 mg, 150 mg, Oral, Daily, Deirdre Liz APRN - CNP, 150 mg at 03/27/21 8229    ARIPiprazole (ABILIFY) tablet 10 mg, 10 mg, Oral, Daily, Deirdre Liz APRN - CNP, 10 mg at 03/27/21 0930    acetaminophen (TYLENOL) tablet 650 mg, 650 mg, Oral, Q6H PRN, Pawel Maynard MD    magnesium hydroxide (MILK OF MAGNESIA) 400 MG/5ML suspension 30 mL, 30 mL, Oral, Daily PRN, Pawel Maynard MD    aluminum & magnesium hydroxide-simethicone (MAALOX) 879-385-42 MG/5ML suspension 30 mL, 30 mL, Oral, PRN, Pawel Maynard MD    hydrOXYzine (VISTARIL) capsule 50 mg, 50 mg, Oral, TID PRN, Jean Carlos Haney MD, 50 mg at 03/25/21 1327    haloperidol lactate (HALDOL) injection 5 mg, 5 mg, Intramuscular, Q6H PRN **OR** haloperidol (HALDOL) tablet 5 mg, 5 mg, Oral, Q6H PRN, Jean Carlos Haney MD, 5 mg at 03/26/21 2045    traZODone (DESYREL) tablet 50 mg, 50 mg, Oral, Nightly PRN, Jean Carlos Haney MD, 50 mg at 03/26/21 2045      Examination:  /60   Pulse 65   Temp 98.3 °F (36.8 °C) (Temporal)   Resp 17   Ht 5' 4\" (1.626 m)   Wt 130 lb (59 kg)   SpO2 97%   BMI 22.31 kg/m²   Gait - steady  Medication side effects(SE): none reported    Mental Status Examination:    Level of consciousness:  within normal limits   Appearance:  fair grooming and fair hygiene  Behavior/Motor:  no abnormalities noted  Attitude toward examiner:  cooperative  Speech:  normal rate and normal volume   Mood: depressed  Affect:  blunted  Thought processes:  linear   Thought content: Devoid of auditory or visual hallucinations. Endorses fleeting SI. Denies HI  Cognition:  oriented to person, place, and time   Concentration distractible  Insight limited   Judgement limited    ASSESSMENT:   Patient symptoms are:  [] Well controlled  [x] Improving  [] Worsening  [] No change      Diagnosis:   Principal Problem:    Severe episode of recurrent major depressive disorder, without psychotic features, mixed(HCC)  Active Problems:    Autism spectrum disorder    Borderline intellectual functioning  Resolved Problems:    * No resolved hospital problems.  *      LABS:    Recent Labs     03/24/21  1702   WBC 7.4   HGB 13.7        Recent Labs     03/24/21  1702      K 4.2      CO2 24   BUN 8   CREATININE 0.8   GLUCOSE 97     Recent Labs     03/24/21  1702   BILITOT 0.3   ALKPHOS 113   AST 38   ALT 46*     Lab Results   Component Value Date    LABAMPH NOT DETECTED 03/24/2021    BARBSCNU NOT DETECTED 03/24/2021    LABBENZ NOT DETECTED 03/24/2021    LABMETH NOT DETECTED 03/24/2021 OPIATESCREENURINE NOT DETECTED 03/24/2021    PHENCYCLIDINESCREENURINE NOT DETECTED 03/24/2021    ETOH <10 03/24/2021     Lab Results   Component Value Date    TSH 1.700 03/24/2021     No results found for: LITHIUM  No results found for: VALPROATE, CBMZ      Treatment Plan:    The patient's diagnosis, treatment plan, medication management were formulated after patient was seen directly by the attending physician and myself and all relevant documentation was reviewed. Reviewed current Medications with the patient. Risk, benefit, side effects, possible outcomes of the medication and alternatives discussed with the patient and the patient demonstrated understanding. The patient was also educated that the outcome of treatment will depend on the medication compliance as directed by the prescribers along with regular follow-up, compliance with the labs and other work-up, as clinically indicated. Wellbutrin  mg daily for depression and anxiety  Abilify 10 mg daily      Collateral information: followed by social work  CD evaluation  Encourage patient to attend group and other milieu activities.   Discharge planning discussed with the patient and treatment team.    PSYCHOTHERAPY/COUNSELING:  [x] Therapeutic interview  [x] Supportive  [] CBT  [] Ongoing  [] Other    [x] Patient continues to need, on a daily basis, active treatment furnished directly by or requiring the supervision of inpatient psychiatric personnel      Anticipated Length of stay: 3 - 5 days based on stability            Electronically signed by SAADIA Roblero CNP on 3/27/2021 at 10:20 AM

## 2021-03-27 NOTE — PLAN OF CARE
Patient is incongruent. He had told the NP he was \"less suicidal\" today but then told this nurse he had not had suicidal thoughts since yesterday. Patient reports he did not sleep well last night due to reoccurring nightmares which featured monsters telling him to hurt himself. He said he watches \"those walking dead movies\". Suggested to patient maybe he should not watch those types of shows to close to bedtime. Patient was social with select peers on the unit this morning then returned to his room for a nap.       Problem: Suicide risk  Goal: Provide patient with safe environment  Description: Provide patient with safe environment  Outcome: Met This Shift     Problem: Depressive Behavior With or Without Suicide Precautions:  Goal: Absence of self-harm  Description: Absence of self-harm  Outcome: Met This Shift     Problem: Depressive Behavior With or Without Suicide Precautions:  Goal: Able to verbalize acceptance of life and situations over which he or she has no control  Description: Able to verbalize acceptance of life and situations over which he or she has no control  Outcome: Ongoing  Goal: Able to verbalize and/or display a decrease in depressive symptoms  Description: Able to verbalize and/or display a decrease in depressive symptoms  Outcome: Ongoing

## 2021-03-27 NOTE — PLAN OF CARE
5 [facility]  Day 3 Interdisciplinary Treatment Plan NOTE    Review Date & Time: 3/27/21 0930    Patient was not in treatment team    Admission Type:   Admission Type: Involuntary    Reason for admission:  Reason for Admission: \"My friend told me to kill myself and I was thinking about what my family has done to me. \"  Estimated Length of Stay Update:  3/27/21  Estimated Discharge Date Update: 3-5 days    PATIENT STRENGTHS:  Patient Strengths Strengths: Communication  Patient Strengths and Limitations:Limitations: Perceives need for assistance with self-care, Difficult relationships / poor social skills, Multiple barriers to leisure interests, Limited education -> difficulty reading or writing, Difficulty problem solving/relies on others to help solve problems  Addictive Behavior:Addictive Behavior  In the past 3 months, have you felt or has someone told you that you have a problem with:  : None  Do you have a history of Chemical Use?: No  Do you have a history of Alcohol Use?: Yes  Do you have a history of Street Drug Abuse?: No  Histroy of Prescripton Drug Abuse?: No  Medical Problems:  Past Medical History:   Diagnosis Date    Autism     OCD (obsessive compulsive disorder)        Risk:  Fall RiskTotal: 69  Da Scale Da Scale Score: 22  BVC Total: 0  Change in scores no.  Changes to plan of Care no    Status EXAM:   Status and Exam  Normal: No  Facial Expression: Brightened, Exaggerated  Affect: Inappropriate  Level of Consciousness: Alert  Mood:Normal: No  Mood: Anxious, Labile  Motor Activity:Normal: Yes  Motor Activity: Repetitive Acts, Increased  Interview Behavior: Cooperative, Impulsive  Preception: Luke to Person, Luke to Time, Luke to Place, Luke to Situation  Attention:Normal: No  Attention: Distractible, Unable to Concentrate  Thought Processes: Circumstantial, Flt.of Ideas  Thought Content:Normal: No  Thought Content: Other(See Comment)(autism)  Hallucinations: None(denies for today)  Delusions: No  Memory:Normal: Yes  Memory: Poor Recent, Poor Remote  Insight and Judgment: No  Insight and Judgment: Poor Judgment, Poor Insight  Present Suicidal Ideation: No(denies)  Present Homicidal Ideation: No    Daily Assessment Last Entry:   Daily Sleep (WDL): Within Defined Limits         Patient Currently in Pain: Denies  Daily Nutrition (WDL): Within Defined Limits    Patient Monitoring:  Frequency of Checks: 4 times per hour, close    Psychiatric Symptoms:   Depression Symptoms  Depression Symptoms: No problems reported or observed. Anxiety Symptoms  Anxiety Symptoms: Generalized  Genny Symptoms  Genyn Symptoms: Labile, Flight of ideas, Poor judgment     Psychosis Symptoms  Delusion Type: No problems reported or observed. Suicide Risk CSSR-S:  1) Within the past month, have you wished you were dead or wished you could go to sleep and not wake up? : Yes  2) Have you actually had any thoughts of killing yourself? : Yes  3) Have you been thinking about how you might kill yourself? : Yes  5) Have you started to work out or worked out the details of how to kill yourself? Do you intend to carry out this plan? : Yes  6) Have you ever done anything, started to do anything, or prepared to do anything to end your life?: No  Change in Result no Change in Plan of care no      EDUCATION:   Learner Progress Toward Treatment Goals: Reviewed results and recommendations of this team, Reviewed group plan and strategies and Reviewed goals and plan of care    Method: Individual    Outcome: Needs reinforcement    PATIENT GOALS: \"watch TV\"    PLAN/TREATMENT RECOMMENDATIONS UPDATE: Patient is encouraged to continue to work towards discharge goal by complying with medications, attending groups and to seek staff if feelings are overwhelming. Staff will offer support and interventions as requested or required. Staff will monitor effects of medications and document patient's mood and behaviors.      GOALS UPDATE: Time frame for Short-Term Goals: 1-3 days      Kwan Machado RN

## 2021-03-27 NOTE — PROGRESS NOTES
Patient has been out on the unit pacing . Patient states that he is anxious and is hearing voices telling him to hurt himself. Reassured patient he safe and that he will be checked on frequently. \" I usually try to sleep it away I hope it works tonight do I have anything for anxiety? \" Patient was given prn for anxiety at 3 pm . Will see what else is available for patient .  Will continue to monitor and observe

## 2021-03-27 NOTE — GROUP NOTE
Shared goal for the day as to watch tv.                                                                       Group Therapy Note    Date: 3/27/2021    Group Start Time: 0215  Group End Time: 0250  Group Topic: Psychoeducation    [facility]    STEPHANIE Garay        Group Therapy Note    Type of Group: Psychoeducation  Wellness Binder Information  Patient's Goal: patient will be able to id 7 cardinal rules for life  Notes: pleasant and engaged in group   Status After Intervention:  Improved  Participation Level:  Active Listener and Interactive  Participation Quality: Appropriate, Attentive, Sharing, and Supportive  Speech:  normal   Thought Process/Content: Logical  Affective Functioning: Congruent  Mood: euthymic  Level of consciousness:  Alert, Oriented x4, and Attentive  Response to Learning: Able to verbalize/acknowledge new learning, Able to retain information, and Progressing to goal  Endings: None Reported  Modes of Intervention: Education, Support, Socialization, Exploration, and Problem-solving  Discipline Responsible: Psychoeducational Specialist  Signature:  Estevan Springer

## 2021-03-28 PROCEDURE — 6370000000 HC RX 637 (ALT 250 FOR IP): Performed by: EMERGENCY MEDICINE

## 2021-03-28 PROCEDURE — 99231 SBSQ HOSP IP/OBS SF/LOW 25: CPT | Performed by: NURSE PRACTITIONER

## 2021-03-28 PROCEDURE — 1240000000 HC EMOTIONAL WELLNESS R&B

## 2021-03-28 PROCEDURE — 6370000000 HC RX 637 (ALT 250 FOR IP): Performed by: NURSE PRACTITIONER

## 2021-03-28 RX ADMIN — TRAZODONE HYDROCHLORIDE 50 MG: 50 TABLET ORAL at 21:06

## 2021-03-28 RX ADMIN — BUPROPION HYDROCHLORIDE 150 MG: 150 TABLET, EXTENDED RELEASE ORAL at 08:06

## 2021-03-28 RX ADMIN — HALOPERIDOL 5 MG: 5 TABLET ORAL at 21:06

## 2021-03-28 RX ADMIN — ARIPIPRAZOLE 10 MG: 10 TABLET ORAL at 08:06

## 2021-03-28 NOTE — PROGRESS NOTES
Patient denies SI,HI and hallucinations. Patient denies depression and anxiety. Patient complains of acid reflux received prns . Patient has been social with peers out on the unit watching television. Voices no questions or concerns at this time .  Will continue to monitor and observe

## 2021-03-28 NOTE — PROGRESS NOTES
Shared goal for the day as to sleep and go with the flow. Patient attended morning community meeting. Attended and participated in morning psychoeducation group. Patient 1 of 12 in attendance.

## 2021-03-28 NOTE — PROGRESS NOTES
Patient approached nurses station c/o nose bleed. Patient stated \"I'm trying to cry it out, I'm trying to cry it out. My nose bleeds sometimes when I cry. I'm really depressed because my parents don't want me anymore. I hardly even talk to them. \" Emotional support provided.

## 2021-03-28 NOTE — PROGRESS NOTES
BEHAVIORAL HEALTH FOLLOW-UP NOTE     3/28/2021     Patient was seen and examined in person, Chart reviewed   Patient's case discussed with staff/team    Chief Complaint: \"I am not suicidal today anymore. \"    Interim History:   Patient is observed in the day room, he brightens with conversation, he is some what attention seeking. He is social with select peers, he is attending groups and taking his medications. There have been no behavioral outbursts, and he has befriended another peer on the unit. He remains somewhat blunted, which is baseline. Tells me this morning that he is no longer suicidal.  He denies auditory or visual hallucinations and is devoid of suicidal or  homicidal ideation intent or plan. He tells me that he is homeless. Social work is looking into CSU for the patient and connection to community services. He is discharge focused. Appetite:   [x] Normal/Unchanged  [] Increased  [] Decreased      Sleep:       [x] Normal/Unchanged  [] Fair       [] Poor              Energy:    [x] Normal/Unchanged  [] Increased  [] Decreased        SI [x] Present  [] Absent    HI  []Present  [x] Absent     Aggression:  [] yes  [x] no    Patient is [x] able  [] unable to CONTRACT FOR SAFETY     PAST MEDICAL/PSYCHIATRIC HISTORY:   Past Medical History:   Diagnosis Date    Autism     OCD (obsessive compulsive disorder)        FAMILY/SOCIAL HISTORY:  No family history on file.   Social History     Socioeconomic History    Marital status: Single     Spouse name: Not on file    Number of children: Not on file    Years of education: Not on file    Highest education level: Not on file   Occupational History    Not on file   Social Needs    Financial resource strain: Not on file    Food insecurity     Worry: Not on file     Inability: Not on file    Transportation needs     Medical: Not on file     Non-medical: Not on file   Tobacco Use    Smoking status: Light Tobacco Smoker    Smokeless tobacco: Never Used Substance and Sexual Activity    Alcohol use: Not Currently     Frequency: Monthly or less     Drinks per session: Patient refused     Binge frequency: Patient refused    Drug use: Never    Sexual activity: Not on file   Lifestyle    Physical activity     Days per week: Not on file     Minutes per session: Not on file    Stress: Not on file   Relationships    Social connections     Talks on phone: Not on file     Gets together: Not on file     Attends Episcopalian service: Not on file     Active member of club or organization: Not on file     Attends meetings of clubs or organizations: Not on file     Relationship status: Not on file    Intimate partner violence     Fear of current or ex partner: Not on file     Emotionally abused: Not on file     Physically abused: Not on file     Forced sexual activity: Not on file   Other Topics Concern    Not on file   Social History Narrative    Not on file           ROS:  [x] All negative/unchanged except if checked.  Explain positive(checked items) below:  [] Constitutional  [] Eyes  [] Ear/Nose/Mouth/Throat  [] Respiratory  [] CV  [] GI  []   [] Musculoskeletal  [] Skin/Breast  [] Neurological  [] Endocrine  [] Heme/Lymph  [] Allergic/Immunologic    Explanation:     MEDICATIONS:    Current Facility-Administered Medications:     benzocaine-menthol (CEPACOL SORE THROAT) lozenge 1 lozenge, 1 lozenge, Oral, Q2H PRN, Jason Estrada, APRN - CNP    buPROPion (WELLBUTRIN XL) extended release tablet 150 mg, 150 mg, Oral, Daily, Bucynthia Estrada, APRN - CNP, 150 mg at 03/28/21 0806    ARIPiprazole (ABILIFY) tablet 10 mg, 10 mg, Oral, Daily, Jason Estrada, APRN - CNP, 10 mg at 03/28/21 7309    acetaminophen (TYLENOL) tablet 650 mg, 650 mg, Oral, Q6H PRN, Kt Marie MD    magnesium hydroxide (MILK OF MAGNESIA) 400 MG/5ML suspension 30 mL, 30 mL, Oral, Daily PRN, Kt Marie MD    aluminum & magnesium hydroxide-simethicone (MAALOX) 001-378-77 MG/5ML suspension 30 mL, 30 mL, Oral, PRN, Juan Ramon Cox MD, 30 mL at 03/27/21 2034    hydrOXYzine (VISTARIL) capsule 50 mg, 50 mg, Oral, TID PRN, Juan Ramon Cox MD, 50 mg at 03/25/21 1327    haloperidol lactate (HALDOL) injection 5 mg, 5 mg, Intramuscular, Q6H PRN **OR** haloperidol (HALDOL) tablet 5 mg, 5 mg, Oral, Q6H PRN, Juan Ramon Cox MD, 5 mg at 03/27/21 2035    traZODone (DESYREL) tablet 50 mg, 50 mg, Oral, Nightly PRN, Juan Ramon Cox MD, 50 mg at 03/27/21 2034      Examination:  /65   Pulse 76   Temp 98.6 °F (37 °C) (Temporal)   Resp 16   Ht 5' 4\" (1.626 m)   Wt 130 lb (59 kg)   SpO2 97%   BMI 22.31 kg/m²   Gait - steady  Medication side effects(SE): none reported    Mental Status Examination:    Level of consciousness:  within normal limits   Appearance:  fair grooming and fair hygiene  Behavior/Motor:  no abnormalities noted  Attitude toward examiner:  cooperative  Speech:  normal rate and normal volume   Mood: depressed  Affect:  blunted  Thought processes:  linear   Thought content: Devoid of auditory or visual hallucinations. Endorses fleeting SI. Denies HI  Cognition:  oriented to person, place, and time   Concentration distractible  Insight limited   Judgement limited    ASSESSMENT:   Patient symptoms are:  [] Well controlled  [x] Improving  [] Worsening  [] No change      Diagnosis:   Principal Problem:    Severe episode of recurrent major depressive disorder, without psychotic features, mixed(HCC)  Active Problems:    Autism spectrum disorder    Borderline intellectual functioning  Resolved Problems:    * No resolved hospital problems. *      LABS:    No results for input(s): WBC, HGB, PLT in the last 72 hours. No results for input(s): NA, K, CL, CO2, BUN, CREATININE, GLUCOSE in the last 72 hours. No results for input(s): BILITOT, ALKPHOS, AST, ALT in the last 72 hours.   Lab Results   Component Value Date    LABAMPH NOT DETECTED 03/24/2021    BARBSCNU NOT DETECTED 03/24/2021    LABBENZ NOT DETECTED 03/24/2021

## 2021-03-29 VITALS
TEMPERATURE: 97.4 F | SYSTOLIC BLOOD PRESSURE: 150 MMHG | RESPIRATION RATE: 16 BRPM | WEIGHT: 130 LBS | BODY MASS INDEX: 22.2 KG/M2 | HEART RATE: 108 BPM | HEIGHT: 64 IN | OXYGEN SATURATION: 97 % | DIASTOLIC BLOOD PRESSURE: 72 MMHG

## 2021-03-29 PROCEDURE — 6370000000 HC RX 637 (ALT 250 FOR IP): Performed by: EMERGENCY MEDICINE

## 2021-03-29 PROCEDURE — 1240000000 HC EMOTIONAL WELLNESS R&B

## 2021-03-29 PROCEDURE — 6370000000 HC RX 637 (ALT 250 FOR IP): Performed by: NURSE PRACTITIONER

## 2021-03-29 PROCEDURE — 99231 SBSQ HOSP IP/OBS SF/LOW 25: CPT | Performed by: NURSE PRACTITIONER

## 2021-03-29 RX ADMIN — MAGNESIUM HYDROXIDE/ALUMINUM HYDROXICE/SIMETHICONE 30 ML: 120; 1200; 1200 SUSPENSION ORAL at 19:36

## 2021-03-29 RX ADMIN — BUPROPION HYDROCHLORIDE 150 MG: 150 TABLET, EXTENDED RELEASE ORAL at 08:52

## 2021-03-29 RX ADMIN — ARIPIPRAZOLE 10 MG: 10 TABLET ORAL at 08:52

## 2021-03-29 ASSESSMENT — PAIN SCALES - GENERAL: PAINLEVEL_OUTOF10: 0

## 2021-03-29 NOTE — PLAN OF CARE
Problem: Depressive Behavior With or Without Suicide Precautions:  Goal: Able to verbalize and/or display a decrease in depressive symptoms  Description: Able to verbalize and/or display a decrease in depressive symptoms  Outcome: Ongoing     Problem: Depressive Behavior With or Without Suicide Precautions:  Goal: Ability to disclose and discuss suicidal ideas will improve  Description: Ability to disclose and discuss suicidal ideas will improve  Outcome: Met This Shift     Problem: Depressive Behavior With or Without Suicide Precautions:  Goal: Absence of self-harm  Description: Absence of self-harm  Outcome: Met This Shift     Pt observed out on unit social with select. Pt affect is flat, worried and sad. Pt denies SI HI and hallucinations. Pt states he slept well but woke up feeling tired. Pt attending groups and taking prescribed medication. No behavioral issues on unit. Will continue to monitor and offer support.

## 2021-03-29 NOTE — PROGRESS NOTES
Patient denies SI,HI and hallucinations. Patient denies anxiety but states he is depressed because he feels like his family doesn't want him. Patient is flat and sad. Been isolative to his room this evening .  Will continue to monitor and observe

## 2021-03-29 NOTE — GROUP NOTE
Group Therapy Note    Date: 3/29/2021    Group Start Time: 1010  Group End Time: 7039  Group Topic: Psychoeducation    [facility]        Group Therapy Note    Attendees: 13         Patient's Goal:  Get home    Notes:  Minimal participation during wellness discussion. Quiet yet pleasant when encouraged to share. Accepting of support and encouragement.     Status After Intervention:  Improved    Participation Level: Minimal    Participation Quality: Appropriate and Attentive      Speech:  hesitant      Thought Process/Content: Logical      Affective Functioning: Congruent      Mood: euthymic      Level of consciousness:  Alert and Attentive      Response to Learning: Progressing to goal      Endings: None Reported    Modes of Intervention: Education, Support, Socialization and Exploration      Discipline Responsible: Psychoeducational Specialist      Signature:  Krystina Hamilton, [address]

## 2021-03-29 NOTE — PROGRESS NOTES
BEHAVIORAL HEALTH FOLLOW-UP NOTE     3/29/2021     Patient was seen and examined in person, Chart reviewed   Patient's case discussed with staff/team    Chief Complaint: \"Do you think  I can go tomorrow? \"    Interim History:   Patient is observed in the day room, he brightens with conversation, he is some what attention seeking. He is social with select peers, he is attending groups and taking his medications. There have been no behavioral outbursts, and he has befriended another peer on the unit. He remains somewhat blunted, which is baseline. Tells me this morning that he is no longer suicidal.  He denies auditory or visual hallucinations and is devoid of suicidal or  homicidal ideation intent or plan. He tells me that he is homeless. Social work is looking into CSU for the patient and connection to community services. He is discharge focused. Appetite:   [x] Normal/Unchanged  [] Increased  [] Decreased      Sleep:       [x] Normal/Unchanged  [] Fair       [] Poor              Energy:    [x] Normal/Unchanged  [] Increased  [] Decreased        SI [x] Present  [] Absent    HI  []Present  [x] Absent     Aggression:  [] yes  [x] no    Patient is [x] able  [] unable to CONTRACT FOR SAFETY     PAST MEDICAL/PSYCHIATRIC HISTORY:   Past Medical History:   Diagnosis Date    Autism     OCD (obsessive compulsive disorder)        FAMILY/SOCIAL HISTORY:  No family history on file.   Social History     Socioeconomic History    Marital status: Single     Spouse name: Not on file    Number of children: Not on file    Years of education: Not on file    Highest education level: Not on file   Occupational History    Not on file   Social Needs    Financial resource strain: Not on file    Food insecurity     Worry: Not on file     Inability: Not on file    Transportation needs     Medical: Not on file     Non-medical: Not on file   Tobacco Use    Smoking status: Light Tobacco Smoker    Smokeless tobacco: Never Used Substance and Sexual Activity    Alcohol use: Not Currently     Frequency: Monthly or less     Drinks per session: Patient refused     Binge frequency: Patient refused    Drug use: Never    Sexual activity: Not on file   Lifestyle    Physical activity     Days per week: Not on file     Minutes per session: Not on file    Stress: Not on file   Relationships    Social connections     Talks on phone: Not on file     Gets together: Not on file     Attends Oriental orthodox service: Not on file     Active member of club or organization: Not on file     Attends meetings of clubs or organizations: Not on file     Relationship status: Not on file    Intimate partner violence     Fear of current or ex partner: Not on file     Emotionally abused: Not on file     Physically abused: Not on file     Forced sexual activity: Not on file   Other Topics Concern    Not on file   Social History Narrative    Not on file           ROS:  [x] All negative/unchanged except if checked.  Explain positive(checked items) below:  [] Constitutional  [] Eyes  [] Ear/Nose/Mouth/Throat  [] Respiratory  [] CV  [] GI  []   [] Musculoskeletal  [] Skin/Breast  [] Neurological  [] Endocrine  [] Heme/Lymph  [] Allergic/Immunologic    Explanation:     MEDICATIONS:    Current Facility-Administered Medications:     benzocaine-menthol (CEPACOL SORE THROAT) lozenge 1 lozenge, 1 lozenge, Oral, Q2H PRN, Bryon Ian, APRN - CNP    buPROPion (WELLBUTRIN XL) extended release tablet 150 mg, 150 mg, Oral, Daily, Bryon Ian, APRN - CNP, 150 mg at 03/29/21 3992    ARIPiprazole (ABILIFY) tablet 10 mg, 10 mg, Oral, Daily, Bryon Ian, APRN - CNP, 10 mg at 03/29/21 3058    acetaminophen (TYLENOL) tablet 650 mg, 650 mg, Oral, Q6H PRN, Dennis Dubon MD    magnesium hydroxide (MILK OF MAGNESIA) 400 MG/5ML suspension 30 mL, 30 mL, Oral, Daily PRN, Dennis Dubon MD    aluminum & magnesium hydroxide-simethicone (MAALOX) 589-566-19 MG/5ML suspension 30 mL, 30 mL, Oral, PRN, Matilde Brower MD, 30 mL at 03/27/21 2034    hydrOXYzine (VISTARIL) capsule 50 mg, 50 mg, Oral, TID PRN, Matilde Brower MD, 50 mg at 03/25/21 1327    haloperidol lactate (HALDOL) injection 5 mg, 5 mg, Intramuscular, Q6H PRN **OR** haloperidol (HALDOL) tablet 5 mg, 5 mg, Oral, Q6H PRN, Matilde Brower MD, 5 mg at 03/28/21 2106    traZODone (DESYREL) tablet 50 mg, 50 mg, Oral, Nightly PRN, Matilde Brower MD, 50 mg at 03/28/21 2106      Examination:  BP (!) 119/56   Pulse 81   Temp 98.2 °F (36.8 °C) (Temporal)   Resp 14   Ht 5' 4\" (1.626 m)   Wt 130 lb (59 kg)   SpO2 97%   BMI 22.31 kg/m²   Gait - steady  Medication side effects(SE): none reported    Mental Status Examination:    Level of consciousness:  within normal limits   Appearance:  fair grooming and fair hygiene  Behavior/Motor:  no abnormalities noted  Attitude toward examiner:  cooperative  Speech:  normal rate and normal volume   Mood: depressed  Affect:  blunted  Thought processes:  linear   Thought content: Devoid of auditory or visual hallucinations. Endorses fleeting SI. Denies HI  Cognition:  oriented to person, place, and time   Concentration distractible  Insight limited   Judgement limited    ASSESSMENT:   Patient symptoms are:  [] Well controlled  [x] Improving  [] Worsening  [] No change      Diagnosis:   Principal Problem:    Severe episode of recurrent major depressive disorder, without psychotic features, mixed(HCC)  Active Problems:    Autism spectrum disorder    Borderline intellectual functioning  Resolved Problems:    * No resolved hospital problems. *      LABS:    No results for input(s): WBC, HGB, PLT in the last 72 hours. No results for input(s): NA, K, CL, CO2, BUN, CREATININE, GLUCOSE in the last 72 hours. No results for input(s): BILITOT, ALKPHOS, AST, ALT in the last 72 hours.   Lab Results   Component Value Date    LABAMPH NOT DETECTED 03/24/2021    BARBSCNU NOT DETECTED 03/24/2021    LABBENZ NOT DETECTED 03/24/2021 LABMETH NOT DETECTED 03/24/2021    OPIATESCREENURINE NOT DETECTED 03/24/2021    PHENCYCLIDINESCREENURINE NOT DETECTED 03/24/2021    ETOH <10 03/24/2021     Lab Results   Component Value Date    TSH 1.700 03/24/2021     No results found for: LITHIUM  No results found for: VALPROATE, CBMZ      Treatment Plan:    The patient's diagnosis, treatment plan, medication management were formulated after patient was seen directly by the attending physician and myself and all relevant documentation was reviewed. Reviewed current Medications with the patient. Risk, benefit, side effects, possible outcomes of the medication and alternatives discussed with the patient and the patient demonstrated understanding. The patient was also educated that the outcome of treatment will depend on the medication compliance as directed by the prescribers along with regular follow-up, compliance with the labs and other work-up, as clinically indicated. Wellbutrin  mg daily for depression and anxiety  Abilify 10 mg daily      Collateral information: followed by social work  CD evaluation  Encourage patient to attend group and other milieu activities.   Discharge planning discussed with the patient and treatment team.    PSYCHOTHERAPY/COUNSELING:  [x] Therapeutic interview  [x] Supportive  [] CBT  [] Ongoing  [] Other    [x] Patient continues to need, on a daily basis, active treatment furnished directly by or requiring the supervision of inpatient psychiatric personnel      Anticipated Length of stay: 3 - 5 days based on stability            Electronically signed by SAADIA Tavares CNP on 3/29/2021 at 2:20 PM

## 2021-03-29 NOTE — CARE COORDINATION
Pt denying all. Pt states that he is depressed about his situation with his parents, but would not go further into detail. Pt's sleep is good, pt slept 6.5 hours last night. Pt attending select groups. Discharge plan is for this pt to step-down to CSU. From CSU the pt may be appropriate for a group home. Collateral received from pt's friend Ceci Pena (See note on 3/25).

## 2021-03-30 PROCEDURE — 6370000000 HC RX 637 (ALT 250 FOR IP): Performed by: NURSE PRACTITIONER

## 2021-03-30 PROCEDURE — 99239 HOSP IP/OBS DSCHRG MGMT >30: CPT | Performed by: NURSE PRACTITIONER

## 2021-03-30 RX ORDER — ARIPIPRAZOLE 10 MG/1
10 TABLET ORAL DAILY
Qty: 30 TABLET | Refills: 3 | Status: SHIPPED | OUTPATIENT
Start: 2021-03-30 | End: 2021-10-12

## 2021-03-30 RX ORDER — BUPROPION HYDROCHLORIDE 150 MG/1
150 TABLET ORAL DAILY
Qty: 30 TABLET | Refills: 3 | Status: ON HOLD | OUTPATIENT
Start: 2021-03-30 | End: 2022-10-05 | Stop reason: HOSPADM

## 2021-03-30 RX ADMIN — ARIPIPRAZOLE 10 MG: 10 TABLET ORAL at 08:25

## 2021-03-30 RX ADMIN — BUPROPION HYDROCHLORIDE 150 MG: 150 TABLET, EXTENDED RELEASE ORAL at 08:25

## 2021-03-30 NOTE — GROUP NOTE
Group Therapy Note    Date: 3/30/2021    Group Start Time: 1000  Group End Time: 4976  Group Topic: Psychoeducation    [facility]        Group Therapy Note    Attendees: 11       Patient's Goal: Be happy    Notes: Active and engaged during wellness discussion. Hopeful to be leaving today. Status After Intervention:  Improved    Participation Level:  Active Listener and Interactive    Participation Quality: Appropriate, Attentive and Sharing      Speech:  normal      Thought Process/Content: Logical      Affective Functioning: Congruent      Mood: euthymic      Level of consciousness:  Alert and Attentive      Response to Learning: Capable of insight, Able to change behavior and Progressing to goal      Endings: None Reported    Modes of Intervention: Education, Support, Socialization and Exploration      Discipline Responsible: Psychoeducational Specialist      Signature:  Krystina Hamilton, [address]

## 2021-03-30 NOTE — CARE COORDINATION
Sw scheduled transportation through [employer].  will call upon arrival. Pt's nurse aware of this.

## 2021-03-30 NOTE — PROGRESS NOTES
[facility]  Discharge Note    Pt discharged with followings belongings:   Dentures: None  Vision - Corrective Lenses: None  Hearing Aid: None  Jewelry: None  Clothing: Shirt, Pants, Socks, Footwear  Were All Patient Medications Collected?: Not Applicable  Other Valuables: Cell phone   Belongings retrieved from unit locker,  and returned to patient. Patient education on aftercare instructions: yes. Patient verbalize understanding of AVS:  yes.     Status EXAM upon discharge:  Status and Exam  Normal: No  Facial Expression: Sad, Worried  Affect: Appropriate  Level of Consciousness: Alert  Mood:Normal: Yes  Mood: Anxious  Motor Activity:Normal: No  Motor Activity: Repetitive Acts  Interview Behavior: Cooperative  Preception: [place] to Person, [place] to Time, [place] to Place, [place] to Situation  Attention:Normal: No  Attention: Distractible  Thought Processes: Circumstantial  Thought Content:Normal: No  Thought Content: Preoccupations  Hallucinations: None  Delusions: No  Memory:Normal: No  Memory: Poor Remote  Insight and Judgment: No  Insight and Judgment: Poor Judgment, Poor Insight  Present Suicidal Ideation: No  Present Homicidal Ideation: No      Metabolic Screening:    Lab Results   Component Value Date    LABA1C 5.4 03/25/2021       Lab Results   Component Value Date    CHOL 110 03/25/2021     Lab Results   Component Value Date    TRIG 107 03/25/2021     Lab Results   Component Value Date    HDL 34 03/25/2021     No components found for: [facility] EVALUATION AND TREATMENT [place]  Lab Results   Component Value Date    LABVLDL 21 03/25/2021       Gerald Nagel RN

## 2021-03-30 NOTE — PLAN OF CARE
Problem: Depressive Behavior With or Without Suicide Precautions:  Goal: Able to verbalize and/or display a decrease in depressive symptoms  Description: Able to verbalize and/or display a decrease in depressive symptoms  Outcome: Met This Shift     Problem: Depressive Behavior With or Without Suicide Precautions:  Goal: Ability to disclose and discuss suicidal ideas will improve  Description: Ability to disclose and discuss suicidal ideas will improve  Outcome: Met This Shift     Problem: Depressive Behavior With or Without Suicide Precautions:  Goal: Absence of self-harm  Description: Absence of self-harm  3/30/2021 0222 by Nasrin Aguirre RN  Outcome: Ongoing     Pt out on floor pacing hallway and social with select peers. Pt denies SI HI and hallucinations. Pt denies anxiety and depression. Pt states he is excited about being discharged today. Pt taking prescribed medication and eating provided meals. Will continue to observe and offer support.

## 2021-03-30 NOTE — CARE COORDINATION
In order to ensure appropriate transition and discharge planning is in place, the following documents have been transmitted to DEVENDRA and Derrick, as the new outpatient provider:     · The d/c diagnosis was transmitted to the next care provider  · The reason for hospitalization was transmitted to the next care provider  · The d/c medications (dosage and indication) were transmitted to the next care provider   · The continuing care plan was transmitted to the next care provider

## 2021-03-30 NOTE — SUICIDE SAFETY PLAN
SAFETY PLAN    A suicide Safety Plan is a document that supports someone when they are having thoughts of suicide. Warning Signs that indicate a suicidal crisis may be developing: What (situations, thoughts, feelings, body sensations, behaviors, etc.) do you experience that lets you know you are beginning to think about suicide? 1. Getting told to kill myself  2. Getting told that I'm not worth it  3. Internal Coping Strategies:  What things can I do (relaxation techniques, hobbies, physical activities, etc.) to take my mind off my problems without contacting another person? 1. Play with my friends  2. Go to abandoned asylums  3. Watch youtube or play on my phone    People and social settings that provide distraction: Who can I call or where can I go to distract me? 1. Name: Mulugeta Engle  Phone: [phone]                     People whom I can ask for help: Who can I call when I need help - for example, friends, family, clergy, someone else? 1. Name: Steen Phoenix                Phone: [phone]             Professionals or [address] [place] [address] I can contact during a crisis: Who can I call for help - for example, my doctor, my psychiatrist, my psychologist, a mental health provider, a suicide hotline? 1. Clinician Name:    Phone:       Clinician Pager or Emergency Contact #:     2. Clinician Name:    Phone:       Clinician Pager or Emergency Contact #:     3. Suicide Prevention Lifeline: 8-627-201-TALK (2180)    4. [address] [place] [address] Emergency Services -  for example, [facility] suicide hotline, [facility] Hotline:       Emergency Services Address:       Emergency Services Phone:     Making the environment safe: How can I make my environment (house/apartment/living space) safer? For example, can I remove guns, medications, and other items? 1. Remove knives  2.  Remove guns

## 2021-08-17 ENCOUNTER — HOSPITAL ENCOUNTER (EMERGENCY)
Age: 21
Discharge: HOME | End: 2021-08-18
Attending: EMERGENCY MEDICINE
Payer: COMMERCIAL

## 2021-08-17 DIAGNOSIS — R56.9 SEIZURE (HCC): Primary | ICD-10-CM

## 2021-08-17 PROCEDURE — 99283 EMERGENCY DEPT VISIT LOW MDM: CPT

## 2021-08-17 RX ORDER — VENLAFAXINE 100 MG/1
100 TABLET ORAL 3 TIMES DAILY
COMMUNITY

## 2021-08-17 RX ORDER — LEVETIRACETAM 250 MG/1
250 TABLET ORAL 2 TIMES DAILY
COMMUNITY
End: 2021-08-18

## 2021-08-18 ENCOUNTER — APPOINTMENT (OUTPATIENT)
Dept: CT IMAGING | Age: 21
End: 2021-08-18
Payer: COMMERCIAL

## 2021-08-18 VITALS
WEIGHT: 190 LBS | HEART RATE: 70 BPM | SYSTOLIC BLOOD PRESSURE: 138 MMHG | TEMPERATURE: 98 F | DIASTOLIC BLOOD PRESSURE: 60 MMHG | RESPIRATION RATE: 20 BRPM | HEIGHT: 68 IN | OXYGEN SATURATION: 96 % | BODY MASS INDEX: 28.79 KG/M2

## 2021-08-18 LAB
ACETAMINOPHEN LEVEL: <5 MCG/ML (ref 10–30)
ALBUMIN SERPL-MCNC: 4.8 G/DL (ref 3.5–5.2)
ALP BLD-CCNC: 127 U/L (ref 40–129)
ALT SERPL-CCNC: 191 U/L (ref 0–40)
AMPHETAMINE SCREEN, URINE: NOT DETECTED
ANION GAP SERPL CALCULATED.3IONS-SCNC: 9 MMOL/L (ref 7–16)
AST SERPL-CCNC: 88 U/L (ref 0–39)
BARBITURATE SCREEN URINE: NOT DETECTED
BASOPHILS ABSOLUTE: 0.06 E9/L (ref 0–0.2)
BASOPHILS RELATIVE PERCENT: 0.9 % (ref 0–2)
BENZODIAZEPINE SCREEN, URINE: NOT DETECTED
BILIRUB SERPL-MCNC: 0.3 MG/DL (ref 0–1.2)
BUN BLDV-MCNC: 14 MG/DL (ref 6–20)
CALCIUM SERPL-MCNC: 9.8 MG/DL (ref 8.6–10.2)
CANNABINOID SCREEN URINE: POSITIVE
CHLORIDE BLD-SCNC: 106 MMOL/L (ref 98–107)
CO2: 27 MMOL/L (ref 22–29)
COCAINE METABOLITE SCREEN URINE: NOT DETECTED
CREAT SERPL-MCNC: 1.1 MG/DL (ref 0.7–1.2)
EKG ATRIAL RATE: 76 BPM
EKG P AXIS: 41 DEGREES
EKG P-R INTERVAL: 128 MS
EKG Q-T INTERVAL: 360 MS
EKG QRS DURATION: 84 MS
EKG QTC CALCULATION (BAZETT): 405 MS
EKG R AXIS: 84 DEGREES
EKG T AXIS: 35 DEGREES
EKG VENTRICULAR RATE: 76 BPM
EOSINOPHILS ABSOLUTE: 0.24 E9/L (ref 0.05–0.5)
EOSINOPHILS RELATIVE PERCENT: 3.5 % (ref 0–6)
ETHANOL: <10 MG/DL (ref 0–0.08)
FENTANYL SCREEN, URINE: NOT DETECTED
GFR AFRICAN AMERICAN: >60
GFR NON-AFRICAN AMERICAN: >60 ML/MIN/1.73
GLUCOSE BLD-MCNC: 94 MG/DL (ref 74–99)
HCT VFR BLD CALC: 41.9 % (ref 37–54)
HEMOGLOBIN: 13.6 G/DL (ref 12.5–16.5)
IMMATURE GRANULOCYTES #: 0.02 E9/L
IMMATURE GRANULOCYTES %: 0.3 % (ref 0–5)
LYMPHOCYTES ABSOLUTE: 1.82 E9/L (ref 1.5–4)
LYMPHOCYTES RELATIVE PERCENT: 26.8 % (ref 20–42)
Lab: ABNORMAL
MCH RBC QN AUTO: 27.5 PG (ref 26–35)
MCHC RBC AUTO-ENTMCNC: 32.5 % (ref 32–34.5)
MCV RBC AUTO: 84.8 FL (ref 80–99.9)
METHADONE SCREEN, URINE: NOT DETECTED
MONOCYTES ABSOLUTE: 0.87 E9/L (ref 0.1–0.95)
MONOCYTES RELATIVE PERCENT: 12.8 % (ref 2–12)
NEUTROPHILS ABSOLUTE: 3.79 E9/L (ref 1.8–7.3)
NEUTROPHILS RELATIVE PERCENT: 55.7 % (ref 43–80)
OPIATE SCREEN URINE: NOT DETECTED
OXYCODONE URINE: NOT DETECTED
PDW BLD-RTO: 12.4 FL (ref 11.5–15)
PHENCYCLIDINE SCREEN URINE: NOT DETECTED
PLATELET # BLD: 237 E9/L (ref 130–450)
PMV BLD AUTO: 9.6 FL (ref 7–12)
POTASSIUM REFLEX MAGNESIUM: 4.5 MMOL/L (ref 3.5–5)
RBC # BLD: 4.94 E12/L (ref 3.8–5.8)
SALICYLATE, SERUM: <0.3 MG/DL (ref 0–30)
SODIUM BLD-SCNC: 142 MMOL/L (ref 132–146)
TOTAL PROTEIN: 7.6 G/DL (ref 6.4–8.3)
TRICYCLIC ANTIDEPRESSANTS SCREEN SERUM: NEGATIVE NG/ML
WBC # BLD: 6.8 E9/L (ref 4.5–11.5)

## 2021-08-18 PROCEDURE — 96374 THER/PROPH/DIAG INJ IV PUSH: CPT

## 2021-08-18 PROCEDURE — 80179 DRUG ASSAY SALICYLATE: CPT

## 2021-08-18 PROCEDURE — 82077 ASSAY SPEC XCP UR&BREATH IA: CPT

## 2021-08-18 PROCEDURE — 93005 ELECTROCARDIOGRAM TRACING: CPT | Performed by: GENERAL PRACTICE

## 2021-08-18 PROCEDURE — 93010 ELECTROCARDIOGRAM REPORT: CPT | Performed by: INTERNAL MEDICINE

## 2021-08-18 PROCEDURE — 85025 COMPLETE CBC W/AUTO DIFF WBC: CPT

## 2021-08-18 PROCEDURE — 70450 CT HEAD/BRAIN W/O DYE: CPT

## 2021-08-18 PROCEDURE — 6360000002 HC RX W HCPCS: Performed by: GENERAL PRACTICE

## 2021-08-18 PROCEDURE — 80143 DRUG ASSAY ACETAMINOPHEN: CPT

## 2021-08-18 PROCEDURE — 80053 COMPREHEN METABOLIC PANEL: CPT

## 2021-08-18 PROCEDURE — 80307 DRUG TEST PRSMV CHEM ANLYZR: CPT

## 2021-08-18 RX ORDER — LEVETIRACETAM 500 MG/1
500 TABLET ORAL 2 TIMES DAILY
Qty: 60 TABLET | Refills: 0 | Status: SHIPPED | OUTPATIENT
Start: 2021-08-18

## 2021-08-18 RX ORDER — LEVETIRACETAM 10 MG/ML
1000 INJECTION INTRAVASCULAR ONCE
Status: COMPLETED | OUTPATIENT
Start: 2021-08-18 | End: 2021-08-18

## 2021-08-18 RX ADMIN — LEVETIRACETAM 1000 MG: 10 INJECTION INTRAVENOUS at 00:51

## 2021-08-18 ASSESSMENT — ENCOUNTER SYMPTOMS
SORE THROAT: 0
NAUSEA: 0
EYE DISCHARGE: 0
SINUS PRESSURE: 0
VOMITING: 0
ABDOMINAL PAIN: 0
WHEEZING: 0
EYE REDNESS: 0
DIARRHEA: 0
BACK PAIN: 0
SHORTNESS OF BREATH: 0
EYE PAIN: 0
COUGH: 0

## 2021-08-18 NOTE — ED PROVIDER NOTES
ED  Provider Note  Admit Date/RoomTime: 8/17/2021 11:27 PM  ED Room: 01/01     HPI:   Gregorio Mooney is a 22 y.o. male presenting to the ED for seizure concerns, beginning several days ago. History comes primarily from the patient. Past medical history includes prior suicidal ideation. The complaint has been intermittent, moderate in severity, improved by nothing and worsened by nothing. Associated symptoms include none. Bhavesh Cross states that he had febrile seizure at the age of 3,, however he had no other seizures in his life until this past Thursday, where he was noted to have a seizure at a local [facility]. He was taken to [facility] that time where he was evaluated, diagnosed with seizures and started on Keppra. He states that he did not get his Keppra filled, and then was asleep this evening when he awoke and saw that his friends were around him and very upset. They stated that he had been having a seizure, where he was shaking and not breathing. Concerned of this presentation, they urged him to seek further evaluation treatment [address] [place] [address],[address] urgency department      On arrival, the patient was assessed with history, physical exam, imaging studies, laboratory studies and ekg, vital signs. Vital signs were stable on arrival and the patient was afebrile. Review of Systems   Constitutional: Positive for activity change. Negative for chills and fever. HENT: Negative for ear pain, sinus pressure and sore throat. Eyes: Negative for pain, discharge and redness. Respiratory: Negative for cough, shortness of breath and wheezing. Cardiovascular: Negative for chest pain. Gastrointestinal: Negative for abdominal pain, diarrhea, nausea and vomiting. Genitourinary: Negative for dysuria and frequency. Musculoskeletal: Negative for arthralgias and back pain. Skin: Negative for rash and wound. Neurological: Positive for seizures.  Negative for weakness and headaches. Hematological: Negative for adenopathy. All other systems reviewed and are negative. Physical Exam  Vitals and nursing note reviewed. Constitutional:       General: He is not in acute distress. Appearance: He is well-developed. He is not ill-appearing or diaphoretic. HENT:      Head: Normocephalic and atraumatic. Mouth/Throat:      Dentition: Abnormal dentition. Eyes:      Pupils: Pupils are equal, round, and reactive to light. Neck:      Vascular: No JVD. Trachea: No tracheal deviation. Cardiovascular:      Rate and Rhythm: Regular rhythm. Heart sounds: No murmur heard. No friction rub. No gallop. Pulmonary:      Effort: Pulmonary effort is normal. No respiratory distress. Breath sounds: Normal breath sounds. No stridor. No wheezing or rales. Chest:      Chest wall: No tenderness. Abdominal:      General: Bowel sounds are normal. There is no distension. Palpations: Abdomen is soft. Tenderness: There is no abdominal tenderness. There is no guarding. Musculoskeletal:         General: Normal range of motion. Cervical back: Normal range of motion. Skin:     General: Skin is warm and dry. Neurological:      General: No focal deficit present. Mental Status: He is alert and oriented to person, place, and time. Cranial Nerves: No cranial nerve deficit. Sensory: No sensory deficit. Motor: No weakness. Coordination: Coordination normal.   Psychiatric:         Behavior: Behavior normal.        EKG interpretation  Rate 76 bpm  Rhythm sinus  Normal ME, QRS, QT intervals  No significant ST segment elevations or depressions  Unremarkable EKG    Procedures     MDM  Number of Diagnoses or Management Options  Seizure [facility])  Diagnosis management comments: Emergency Department evaluation was notable for reassuring work-up in the setting of questionable seizure.   Patient was maintained on seizure precautions during the entirety of his emergency department stay and was administered Keppra shortly after arrival.  Laboratory studies were negative for leukocytosis, anemia or thrombocytopenia, electrolytes were balanced, renal function was good, drug screen was notable only for cannabinoids, CT of the head was negative. EKG was unremarkable. Patient was advised to follow-up with a neurologist for his possible seizures. and was advised to take his Keppra that he was recently prescribed from [facility] for home use upon discharge. They were advised to return to the emergency department should they develop fever, chills, night sweats, nausea, vomiting, diarrhea, chest pain, shortness of breath, or worsening of their symptoms despite treatment from this emergency department visit. They were instructed to follow-up with their primary care provider in 2 days. This information was relayed to the patient who understood this plan of care and was amenable to the plan.            --------------------------------------------- PAST HISTORY ---------------------------------------------  Past Medical History:  has a past medical history of Autism and OCD (obsessive compulsive disorder). Past Surgical History:  has no past surgical history on file. Social History:  reports that he has been smoking. He has never used smokeless tobacco. He reports previous alcohol use. He reports that he does not use drugs. Family History: family history is not on file. The patients home medications have been reviewed. Allergies: Patient has no known allergies.     -------------------------------------------------- RESULTS -------------------------------------------------  Labs:  Results for orders placed or performed during the hospital encounter of 08/17/21   CBC Auto Differential   Result Value Ref Range    WBC 6.8 4.5 - 11.5 E9/L    RBC 4.94 3.80 - 5.80 E12/L    Hemoglobin 13.6 12.5 - 16.5 g/dL    Hematocrit 41.9 37.0 - 54.0 %    MCV 84.8 80.0 - 99.9 fL    MCH 27.5 26.0 - 35.0 pg    MCHC 32.5 32.0 - 34.5 %    RDW 12.4 11.5 - 15.0 fL    Platelets 645 273 - 238 E9/L    MPV 9.6 7.0 - 12.0 fL    Neutrophils % 55.7 43.0 - 80.0 %    Immature Granulocytes % 0.3 0.0 - 5.0 %    Lymphocytes % 26.8 20.0 - 42.0 %    Monocytes % 12.8 (H) 2.0 - 12.0 %    Eosinophils % 3.5 0.0 - 6.0 %    Basophils % 0.9 0.0 - 2.0 %    Neutrophils Absolute 3.79 1.80 - 7.30 E9/L    Immature Granulocytes # 0.02 E9/L    Lymphocytes Absolute 1.82 1.50 - 4.00 E9/L    Monocytes Absolute 0.87 0.10 - 0.95 E9/L    Eosinophils Absolute 0.24 0.05 - 0.50 E9/L    Basophils Absolute 0.06 0.00 - 0.20 E9/L   Comprehensive Metabolic Panel w/ Reflex to MG   Result Value Ref Range    Sodium 142 132 - 146 mmol/L    Potassium reflex Magnesium 4.5 3.5 - 5.0 mmol/L    Chloride 106 98 - 107 mmol/L    CO2 27 22 - 29 mmol/L    Anion Gap 9 7 - 16 mmol/L    Glucose 94 74 - 99 mg/dL    BUN 14 6 - 20 mg/dL    CREATININE 1.1 0.7 - 1.2 mg/dL    GFR Non-African American >60 >=60 mL/min/1.73    GFR African American >60     Calcium 9.8 8.6 - 10.2 mg/dL    Total Protein 7.6 6.4 - 8.3 g/dL    Albumin 4.8 3.5 - 5.2 g/dL    Total Bilirubin 0.3 0.0 - 1.2 mg/dL    Alkaline Phosphatase 127 40 - 129 U/L     (H) 0 - 40 U/L    AST 88 (H) 0 - 39 U/L   URINE DRUG SCREEN   Result Value Ref Range    Amphetamine Screen, Urine NOT DETECTED Negative <1000 ng/mL    Barbiturate Screen, Ur NOT DETECTED Negative < 200 ng/mL    Benzodiazepine Screen, Urine NOT DETECTED Negative < 200 ng/mL    Cannabinoid Scrn, Ur POSITIVE (A) Negative < 50ng/mL    Cocaine Metabolite Screen, Urine NOT DETECTED Negative < 300 ng/mL    Opiate Scrn, Ur NOT DETECTED Negative < 300ng/mL    PCP Screen, Urine NOT DETECTED Negative < 25 ng/mL    Methadone Screen, Urine NOT DETECTED Negative <300 ng/mL    Oxycodone Urine NOT DETECTED Negative <100 ng/mL    FENTANYL SCREEN, URINE NOT DETECTED Negative <1 ng/mL    Drug Screen Comment: see below    Serum Drug Screen Result Value Ref Range    Ethanol Lvl <10 mg/dL    Acetaminophen Level <5.0 (L) 10.0 - 94.5 mcg/mL    Salicylate, Serum <5.6 0.0 - 30.0 mg/dL   EKG 12 Lead   Result Value Ref Range    Ventricular Rate 76 BPM    Atrial Rate 76 BPM    P-R Interval 128 ms    QRS Duration 84 ms    Q-T Interval 360 ms    QTc Calculation (Bazett) 405 ms    P Axis 41 degrees    R Axis 84 degrees    T Axis 35 degrees       Radiology:  CT Head WO Contrast   Final Result   No acute intracranial abnormality.             ------------------------- NURSING NOTES AND VITALS REVIEWED ---------------------------  Date / Time Roomed:  8/17/2021 11:27 PM  ED Bed Assignment:  01/01    The nursing notes within the ED encounter and vital signs as below have been reviewed. /60   Pulse 70   Temp 98 °F (36.7 °C) (Infrared)   Resp 20   Ht 5' 8\" (1.727 m)   Wt 190 lb (86.2 kg)   SpO2 96%   BMI 28.89 kg/m²   Oxygen Saturation Interpretation: Normal      ------------------------------------------ PROGRESS NOTES ------------------------------------------  12:18 AM EDT  I have spoken with the patient and discussed todays results, in addition to providing specific details for the plan of care and counseling regarding the diagnosis and prognosis. Their questions are answered at this time and they are agreeable with the plan. I discussed at length with them reasons for immediate return here for re evaluation. They will followup with their primary care physician by calling their office tomorrow. --------------------------------- ADDITIONAL PROVIDER NOTES ---------------------------------  At this time the patient is without objective evidence of an acute process requiring hospitalization or inpatient management. They have remained hemodynamically stable throughout their entire ED visit and are stable for discharge with outpatient follow-up.      The plan has been discussed in detail and they are aware of the specific conditions for emergent

## 2021-08-18 NOTE — ED TRIAGE NOTES
Pt. States he 'lost' his medication:  Keppra,  effexor and something for his suicidal thoughts.   DENIES  Self harm, SI

## 2021-08-19 ENCOUNTER — HOSPITAL ENCOUNTER (EMERGENCY)
Age: 21
Discharge: HOME | End: 2021-08-20
Attending: STUDENT IN AN ORGANIZED HEALTH CARE EDUCATION/TRAINING PROGRAM
Payer: COMMERCIAL

## 2021-08-19 DIAGNOSIS — R45.851 SUICIDAL IDEATION: Primary | ICD-10-CM

## 2021-08-19 DIAGNOSIS — R21 RASH AND OTHER NONSPECIFIC SKIN ERUPTION: ICD-10-CM

## 2021-08-19 DIAGNOSIS — R79.89 ELEVATED TSH: ICD-10-CM

## 2021-08-19 PROCEDURE — 87635 SARS-COV-2 COVID-19 AMP PRB: CPT

## 2021-08-19 PROCEDURE — 93005 ELECTROCARDIOGRAM TRACING: CPT | Performed by: STUDENT IN AN ORGANIZED HEALTH CARE EDUCATION/TRAINING PROGRAM

## 2021-08-19 PROCEDURE — 99285 EMERGENCY DEPT VISIT HI MDM: CPT

## 2021-08-20 VITALS
DIASTOLIC BLOOD PRESSURE: 62 MMHG | TEMPERATURE: 98.1 F | RESPIRATION RATE: 20 BRPM | WEIGHT: 190 LBS | SYSTOLIC BLOOD PRESSURE: 110 MMHG | HEIGHT: 66 IN | BODY MASS INDEX: 30.53 KG/M2 | OXYGEN SATURATION: 98 % | HEART RATE: 97 BPM

## 2021-08-20 LAB
ACETAMINOPHEN LEVEL: <5 MCG/ML (ref 10–30)
ALBUMIN SERPL-MCNC: 4.6 G/DL (ref 3.5–5.2)
ALP BLD-CCNC: 132 U/L (ref 40–129)
ALT SERPL-CCNC: 157 U/L (ref 0–40)
AMPHETAMINE SCREEN, URINE: NOT DETECTED
ANION GAP SERPL CALCULATED.3IONS-SCNC: 10 MMOL/L (ref 7–16)
AST SERPL-CCNC: 70 U/L (ref 0–39)
BACTERIA: NORMAL /HPF
BARBITURATE SCREEN URINE: NOT DETECTED
BASOPHILS ABSOLUTE: 0.06 E9/L (ref 0–0.2)
BASOPHILS RELATIVE PERCENT: 0.6 % (ref 0–2)
BENZODIAZEPINE SCREEN, URINE: NOT DETECTED
BILIRUB SERPL-MCNC: 0.3 MG/DL (ref 0–1.2)
BILIRUBIN URINE: NEGATIVE
BLOOD, URINE: NEGATIVE
BUN BLDV-MCNC: 12 MG/DL (ref 6–20)
CALCIUM SERPL-MCNC: 9.6 MG/DL (ref 8.6–10.2)
CANNABINOID SCREEN URINE: POSITIVE
CHLORIDE BLD-SCNC: 106 MMOL/L (ref 98–107)
CLARITY: CLEAR
CO2: 26 MMOL/L (ref 22–29)
COCAINE METABOLITE SCREEN URINE: NOT DETECTED
COLOR: YELLOW
CREAT SERPL-MCNC: 0.9 MG/DL (ref 0.7–1.2)
EKG ATRIAL RATE: 78 BPM
EKG P AXIS: 46 DEGREES
EKG P-R INTERVAL: 130 MS
EKG Q-T INTERVAL: 360 MS
EKG QRS DURATION: 90 MS
EKG QTC CALCULATION (BAZETT): 410 MS
EKG R AXIS: 89 DEGREES
EKG T AXIS: 40 DEGREES
EKG VENTRICULAR RATE: 78 BPM
EOSINOPHILS ABSOLUTE: 0.49 E9/L (ref 0.05–0.5)
EOSINOPHILS RELATIVE PERCENT: 5.2 % (ref 0–6)
ETHANOL: <10 MG/DL (ref 0–0.08)
FENTANYL SCREEN, URINE: NOT DETECTED
GFR AFRICAN AMERICAN: >60
GFR NON-AFRICAN AMERICAN: >60 ML/MIN/1.73
GLUCOSE BLD-MCNC: 99 MG/DL (ref 74–99)
GLUCOSE URINE: NEGATIVE MG/DL
HCT VFR BLD CALC: 41.1 % (ref 37–54)
HEMOGLOBIN: 13.5 G/DL (ref 12.5–16.5)
IMMATURE GRANULOCYTES #: 0.04 E9/L
IMMATURE GRANULOCYTES %: 0.4 % (ref 0–5)
KETONES, URINE: NEGATIVE MG/DL
LEUKOCYTE ESTERASE, URINE: NEGATIVE
LYMPHOCYTES ABSOLUTE: 2.58 E9/L (ref 1.5–4)
LYMPHOCYTES RELATIVE PERCENT: 27.4 % (ref 20–42)
Lab: ABNORMAL
MCH RBC QN AUTO: 27.4 PG (ref 26–35)
MCHC RBC AUTO-ENTMCNC: 32.8 % (ref 32–34.5)
MCV RBC AUTO: 83.5 FL (ref 80–99.9)
METHADONE SCREEN, URINE: NOT DETECTED
MONOCYTES ABSOLUTE: 1.04 E9/L (ref 0.1–0.95)
MONOCYTES RELATIVE PERCENT: 11 % (ref 2–12)
NEUTROPHILS ABSOLUTE: 5.22 E9/L (ref 1.8–7.3)
NEUTROPHILS RELATIVE PERCENT: 55.4 % (ref 43–80)
NITRITE, URINE: NEGATIVE
OPIATE SCREEN URINE: NOT DETECTED
OXYCODONE URINE: NOT DETECTED
PDW BLD-RTO: 12.2 FL (ref 11.5–15)
PH UA: 5.5 (ref 5–9)
PHENCYCLIDINE SCREEN URINE: NOT DETECTED
PLATELET # BLD: 270 E9/L (ref 130–450)
PMV BLD AUTO: 9.4 FL (ref 7–12)
POTASSIUM REFLEX MAGNESIUM: 4 MMOL/L (ref 3.5–5)
PROTEIN UA: NORMAL MG/DL
RBC # BLD: 4.92 E12/L (ref 3.8–5.8)
RBC UA: NORMAL /HPF (ref 0–2)
SALICYLATE, SERUM: <0.3 MG/DL (ref 0–30)
SARS-COV-2, NAAT: ABNORMAL
SARS-COV-2, NAAT: NOT DETECTED
SODIUM BLD-SCNC: 142 MMOL/L (ref 132–146)
SPECIFIC GRAVITY UA: >=1.03 (ref 1–1.03)
TOTAL PROTEIN: 7 G/DL (ref 6.4–8.3)
TRICYCLIC ANTIDEPRESSANTS SCREEN SERUM: NEGATIVE NG/ML
TSH SERPL DL<=0.05 MIU/L-ACNC: 4.36 UIU/ML (ref 0.27–4.2)
UROBILINOGEN, URINE: 1 E.U./DL
WBC # BLD: 9.4 E9/L (ref 4.5–11.5)
WBC UA: NORMAL /HPF (ref 0–5)

## 2021-08-20 PROCEDURE — 6370000000 HC RX 637 (ALT 250 FOR IP): Performed by: STUDENT IN AN ORGANIZED HEALTH CARE EDUCATION/TRAINING PROGRAM

## 2021-08-20 PROCEDURE — 84443 ASSAY THYROID STIM HORMONE: CPT

## 2021-08-20 PROCEDURE — 81001 URINALYSIS AUTO W/SCOPE: CPT

## 2021-08-20 PROCEDURE — 6370000000 HC RX 637 (ALT 250 FOR IP): Performed by: EMERGENCY MEDICINE

## 2021-08-20 PROCEDURE — 80179 DRUG ASSAY SALICYLATE: CPT

## 2021-08-20 PROCEDURE — 80307 DRUG TEST PRSMV CHEM ANLYZR: CPT

## 2021-08-20 PROCEDURE — 80053 COMPREHEN METABOLIC PANEL: CPT

## 2021-08-20 PROCEDURE — 80177 DRUG SCRN QUAN LEVETIRACETAM: CPT

## 2021-08-20 PROCEDURE — 93010 ELECTROCARDIOGRAM REPORT: CPT | Performed by: INTERNAL MEDICINE

## 2021-08-20 PROCEDURE — 85025 COMPLETE CBC W/AUTO DIFF WBC: CPT

## 2021-08-20 PROCEDURE — 80143 DRUG ASSAY ACETAMINOPHEN: CPT

## 2021-08-20 PROCEDURE — 82077 ASSAY SPEC XCP UR&BREATH IA: CPT

## 2021-08-20 PROCEDURE — 87635 SARS-COV-2 COVID-19 AMP PRB: CPT

## 2021-08-20 RX ORDER — LEVETIRACETAM 500 MG/1
500 TABLET ORAL ONCE
Status: COMPLETED | OUTPATIENT
Start: 2021-08-20 | End: 2021-08-20

## 2021-08-20 RX ADMIN — LEVETIRACETAM 500 MG: 500 TABLET ORAL at 11:07

## 2021-08-20 RX ADMIN — HYDROCORTISONE: 25 CREAM TOPICAL at 02:05

## 2021-08-20 ASSESSMENT — ENCOUNTER SYMPTOMS
NAUSEA: 0
CHEST TIGHTNESS: 0
BACK PAIN: 0
ABDOMINAL DISTENTION: 0
RHINORRHEA: 0
COLOR CHANGE: 0
EYE REDNESS: 0
COUGH: 0
EYE PAIN: 0
SHORTNESS OF BREATH: 0
SORE THROAT: 0
ABDOMINAL PAIN: 0
CONSTIPATION: 0
DIARRHEA: 0

## 2021-08-20 ASSESSMENT — PATIENT HEALTH QUESTIONNAIRE - PHQ9: SUM OF ALL RESPONSES TO PHQ QUESTIONS 1-9: 12

## 2021-08-20 NOTE — ED NOTES
Patient states that he has suicidal thoughts starting today,  Has no plan to harm himself and has made no attempts in the past. All ligatures removed, suicide precautions implemented.  All belongings collected, 1 t-shirt, 1-pr of shorts, 1- pr of shoes, pt placed in paper gown     Ernesto Magdaleno RN  08/20/21 1453

## 2021-08-20 NOTE — ED NOTES
covid results are negative. 1st swab undermined. 2nd swab negative. Lab notified for request screen be corrected (from abnormal)- lab declined.      Keith Epstein RN  08/20/21 1381

## 2021-08-20 NOTE — ED NOTES
Pt referred to Thu Buckner at the [facility] for inpatient admission     ROSELINE Reynaga, [facility]  08/20/21 1844
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
## 2021-08-20 NOTE — ED NOTES
Tania Arevalo from the [address] [place] [address] Lucian called to inform that Pt has been accepted by [facility] [place], [place]. Tania Arevalo informed she called Eugen[facility] Milton and talked to Stacey about the paper covid screening and pink slip being faxed.      ROSELINE Portillo, [facility]  08/20/21 7341
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
## 2021-08-20 NOTE — ED NOTES
Attempted to call nurse to nurse report to generation.  No answer and phone when to busy after letting it ring for an extended period of time     [facility], [address]  08/20/21 0990

## 2021-08-20 NOTE — ED NOTES
Called [facility] spoke with Zohra. She took his info and will call back to do the telehealth with him.      Fam Matson  08/20/21 3414

## 2021-08-20 NOTE — ED NOTES
Bed: 06  Expected date:   Expected time:   Means of arrival:   Comments:     Mason Lovell RN  08/19/21 5769

## 2021-08-21 LAB — KEPPRA: <2 UG/ML (ref 12–46)

## 2021-09-03 ENCOUNTER — HOSPITAL ENCOUNTER (EMERGENCY)
Age: 21
Discharge: HOME | End: 2021-09-03
Attending: EMERGENCY MEDICINE
Payer: COMMERCIAL

## 2021-09-03 VITALS
HEIGHT: 68 IN | HEART RATE: 101 BPM | TEMPERATURE: 98.6 F | SYSTOLIC BLOOD PRESSURE: 139 MMHG | DIASTOLIC BLOOD PRESSURE: 82 MMHG | BODY MASS INDEX: 27.28 KG/M2 | WEIGHT: 180 LBS | RESPIRATION RATE: 18 BRPM | OXYGEN SATURATION: 97 %

## 2021-09-03 DIAGNOSIS — R56.9 SEIZURE (HCC): Primary | ICD-10-CM

## 2021-09-03 LAB
ANION GAP SERPL CALCULATED.3IONS-SCNC: 10 MMOL/L (ref 7–16)
BUN BLDV-MCNC: 12 MG/DL (ref 6–20)
CALCIUM SERPL-MCNC: 9.4 MG/DL (ref 8.6–10.2)
CHLORIDE BLD-SCNC: 106 MMOL/L (ref 98–107)
CHP ED QC CHECK: NORMAL
CO2: 26 MMOL/L (ref 22–29)
CREAT SERPL-MCNC: 1 MG/DL (ref 0.7–1.2)
GFR AFRICAN AMERICAN: >60
GFR NON-AFRICAN AMERICAN: >60 ML/MIN/1.73
GLUCOSE BLD-MCNC: 101 MG/DL
GLUCOSE BLD-MCNC: 99 MG/DL (ref 74–99)
METER GLUCOSE: 101 MG/DL (ref 74–99)
POTASSIUM REFLEX MAGNESIUM: 4.2 MMOL/L (ref 3.5–5)
SODIUM BLD-SCNC: 142 MMOL/L (ref 132–146)

## 2021-09-03 PROCEDURE — 99284 EMERGENCY DEPT VISIT MOD MDM: CPT

## 2021-09-03 PROCEDURE — 80048 BASIC METABOLIC PNL TOTAL CA: CPT

## 2021-09-03 PROCEDURE — 36415 COLL VENOUS BLD VENIPUNCTURE: CPT

## 2021-09-03 PROCEDURE — 82962 GLUCOSE BLOOD TEST: CPT

## 2021-09-03 ASSESSMENT — ENCOUNTER SYMPTOMS
ABDOMINAL PAIN: 0
VOICE CHANGE: 0
VOMITING: 0
COUGH: 0
PHOTOPHOBIA: 0
SHORTNESS OF BREATH: 0
TROUBLE SWALLOWING: 0
DIARRHEA: 0
NAUSEA: 0

## 2021-09-04 NOTE — ED PROVIDER NOTES
Chief complaint: Seizure  HPI   Patient is a 73-year-old male brought in by ambulance due to witnessed seizure. EMT reports that patient was seizing for 15 minutes prior to their arrival.  No urinary incontinence or injury noted. No intervention required in the ambulance since patient was not actively seizing. Moderate in severity. Lasted approximately 5 minutes. No treatment prior to arrival.  When present nothing to better. Nothing made it worse. Patient denies any postictal state. Patient denies any symptoms at this time including lightheadedness, dizziness, acute vision changes, chest pain, shortness of breath, nausea, vomiting or headache. Patient has a history of epilepsy on Keppra. Review of Systems   Constitutional: Negative for chills and fever. HENT: Negative for trouble swallowing and voice change. Eyes: Negative for photophobia and visual disturbance. Respiratory: Negative for cough and shortness of breath. Cardiovascular: Negative for chest pain and palpitations. Gastrointestinal: Negative for abdominal pain, diarrhea, nausea and vomiting. Genitourinary: Negative for dysuria. Musculoskeletal: Negative for arthralgias. Skin: Negative for rash and wound. Neurological: Negative for dizziness and headaches. Psychiatric/Behavioral: Negative for behavioral problems and confusion. Physical Exam  Constitutional:       General: He is not in acute distress. Appearance: Normal appearance. He is not ill-appearing. HENT:      Head: Normocephalic and atraumatic. Mouth/Throat:      Mouth: Mucous membranes are moist.      Pharynx: Oropharynx is clear. Eyes:      Extraocular Movements: Extraocular movements intact. Conjunctiva/sclera: Conjunctivae normal.      Pupils: Pupils are equal, round, and reactive to light. Cardiovascular:      Rate and Rhythm: Normal rate and regular rhythm. Pulses: Normal pulses. Heart sounds: Normal heart sounds. Pulmonary:      Effort: Pulmonary effort is normal. No respiratory distress. Breath sounds: Normal breath sounds. No wheezing or rales. Abdominal:      Tenderness: There is no abdominal tenderness. There is no guarding or rebound. Musculoskeletal:      Cervical back: Normal range of motion and neck supple. Skin:     General: Skin is warm and dry. Capillary Refill: Capillary refill takes less than 2 seconds. Neurological:      General: No focal deficit present. Mental Status: He is alert and oriented to person, place, and time. Cranial Nerves: No cranial nerve deficit. Coordination: Coordination normal.   Psychiatric:         Mood and Affect: Mood normal.         Behavior: Behavior normal.          MDM   Patient is a 64-year-old male with history of epilepsy on Keppra and autism presenting due to seizure. On arrival, patient was awake alert and answering questions. During initial evaluation, patient was hemodynamically stable and appeared in no acute distress. Lab work-up negative. POCT glucose negative. Patient stable for discharge and agrees with this plan. ED Course as of Sep 03 2035   Fri Sep 03, 2021   2028 Patient reevaluated and still appears to be doing well. [PP]   2030 Lab work reviewed and unremarkable. [PP]   2030 Attending physician discussed decision to discharge with patient and he is agreeable to plan. [PP]      ED Course User Index  [PP] Irish Crigler, DO     ED Course as of Sep 03 2036   Fri Sep 03, 2021   2028 Patient reevaluated and still appears to be doing well. [PP]   2030 Lab work reviewed and unremarkable. [PP]   2030 Attending physician discussed decision to discharge with patient and he is agreeable to plan.     [PP]      ED Course User Index  [PP] Irish Crigler, DO       --------------------------------------------- PAST HISTORY ---------------------------------------------  Past Medical History:  has a past medical history of Autism and OCD (obsessive compulsive disorder). Past Surgical History:  has no past surgical history on file. Social History:  reports that he has been smoking. He has never used smokeless tobacco. He reports previous alcohol use. He reports that he does not use drugs. Family History: family history is not on file. The patients home medications have been reviewed. Allergies: Patient has no known allergies. -------------------------------------------------- RESULTS -------------------------------------------------  Labs:  Results for orders placed or performed during the hospital encounter of 63/48/00   Basic Metabolic Panel w/ Reflex to MG   Result Value Ref Range    Sodium 142 132 - 146 mmol/L    Potassium reflex Magnesium 4.2 3.5 - 5.0 mmol/L    Chloride 106 98 - 107 mmol/L    CO2 26 22 - 29 mmol/L    Anion Gap 10 7 - 16 mmol/L    Glucose 99 74 - 99 mg/dL    BUN 12 6 - 20 mg/dL    CREATININE 1.0 0.7 - 1.2 mg/dL    GFR Non-African American >60 >=60 mL/min/1.73    GFR African American >60     Calcium 9.4 8.6 - 10.2 mg/dL   POCT glucose   Result Value Ref Range    Glucose 101 mg/dL    QC OK? ok    POCT Glucose   Result Value Ref Range    Meter Glucose 101 (H) 74 - 99 mg/dL       Radiology:  No orders to display       ------------------------- NURSING NOTES AND VITALS REVIEWED ---------------------------  Date / Time Roomed:  9/3/2021  6:07 PM  ED Bed Assignment:  14/14    The nursing notes within the ED encounter and vital signs as below have been reviewed.    /82   Pulse 101   Temp 98.6 °F (37 °C)   Resp 18   Ht 5' 8\" (1.727 m)   Wt 180 lb (81.6 kg)   SpO2 97%   BMI 27.37 kg/m²   Oxygen Saturation Interpretation: Normal      ------------------------------------------ PROGRESS NOTES ------------------------------------------  I have spoken with the patient and discussed todays results, in addition to providing specific details for the plan of care and counseling regarding the diagnosis and prognosis. Their questions are answered at this time and they are agreeable with the plan. I discussed at length with them reasons for immediate return here for re evaluation. They will followup with primary care by calling their office tomorrow. --------------------------------- ADDITIONAL PROVIDER NOTES ---------------------------------  At this time the patient is without objective evidence of an acute process requiring hospitalization or inpatient management. They have remained hemodynamically stable throughout their entire ED visit and are stable for discharge with outpatient follow-up. The plan has been discussed in detail and they are aware of the specific conditions for emergent return, as well as the importance of follow-up. New Prescriptions    No medications on file       Diagnosis:  1. Seizure ([address] Stable       Disposition:  Patient's disposition: Discharge to home  Patient's condition is stable. Forrest Ellington DO  Resident  09/03/21 2038    ATTENDING PROVIDER ATTESTATION:     Deb Hanson presented to the emergency department for evaluation of Seizures    I have reviewed and discussed the case, including pertinent history (medical, surgical, family and social) and exam findings with the Resident and the Nurse assigned to Deb Hanson. I have personally performed and/or participated in the history, exam, medical decision making, and procedures and agree with all pertinent clinical information. I have reviewed my findings and recommendations with Deb Margie and members of family present at the time of disposition. I, Dr. Alfreda Jaffe am the primary physician of record for this patient. MDM: The patient is 22 y.o. male  with a past medical history of       Diagnosis Date    Autism     OCD (obsessive compulsive disorder)      presenting to the emergency department with a chief complaint of seizure. Patient does have a history of seizures.   Differential diagnosis includes but not limited to breakthrough seizure, hypoglycemia, hyponatremia. Patient did have BMP unremarkable. Patient was at his baseline. Patient will be discharged home. Patient was told no driving for 6 months until seizure-free. He states that he does not drive. My findings/plan: The encounter diagnosis was Seizure [facility]).   Discharge Medication List as of 9/3/2021  9:08 PM        Evin Acevedo, [address], DO  09/04/21 9065

## 2021-10-12 ENCOUNTER — HOSPITAL ENCOUNTER (EMERGENCY)
Age: 21
Discharge: PSYCHIATRIC OR SUBSTANCE USE FACILITY | End: 2021-10-13
Attending: EMERGENCY MEDICINE
Payer: COMMERCIAL

## 2021-10-12 DIAGNOSIS — R45.851 DEPRESSION WITH SUICIDAL IDEATION: Primary | ICD-10-CM

## 2021-10-12 DIAGNOSIS — R45.850 HOMICIDAL IDEATIONS: ICD-10-CM

## 2021-10-12 DIAGNOSIS — F32.A DEPRESSION WITH SUICIDAL IDEATION: Primary | ICD-10-CM

## 2021-10-12 LAB
ACETAMINOPHEN LEVEL: <5 MCG/ML (ref 10–30)
ALBUMIN SERPL-MCNC: 4.8 G/DL (ref 3.5–5.2)
ALP BLD-CCNC: 110 U/L (ref 40–129)
ALT SERPL-CCNC: 115 U/L (ref 0–40)
AMPHETAMINE SCREEN, URINE: NOT DETECTED
ANION GAP SERPL CALCULATED.3IONS-SCNC: 13 MMOL/L (ref 7–16)
AST SERPL-CCNC: 61 U/L (ref 0–39)
BARBITURATE SCREEN URINE: NOT DETECTED
BASOPHILS ABSOLUTE: 0.17 E9/L (ref 0–0.2)
BASOPHILS RELATIVE PERCENT: 1.3 % (ref 0–2)
BENZODIAZEPINE SCREEN, URINE: NOT DETECTED
BILIRUB SERPL-MCNC: <0.2 MG/DL (ref 0–1.2)
BILIRUBIN URINE: NEGATIVE
BLOOD, URINE: NEGATIVE
BUN BLDV-MCNC: 14 MG/DL (ref 6–20)
CALCIUM SERPL-MCNC: 9.7 MG/DL (ref 8.6–10.2)
CANNABINOID SCREEN URINE: NOT DETECTED
CHLORIDE BLD-SCNC: 106 MMOL/L (ref 98–107)
CLARITY: CLEAR
CO2: 23 MMOL/L (ref 22–29)
COCAINE METABOLITE SCREEN URINE: NOT DETECTED
COLOR: YELLOW
CREAT SERPL-MCNC: 1 MG/DL (ref 0.7–1.2)
EKG ATRIAL RATE: 82 BPM
EKG P AXIS: 42 DEGREES
EKG P-R INTERVAL: 128 MS
EKG Q-T INTERVAL: 366 MS
EKG QRS DURATION: 86 MS
EKG QTC CALCULATION (BAZETT): 427 MS
EKG R AXIS: 81 DEGREES
EKG T AXIS: 37 DEGREES
EKG VENTRICULAR RATE: 82 BPM
EOSINOPHILS ABSOLUTE: 1.79 E9/L (ref 0.05–0.5)
EOSINOPHILS RELATIVE PERCENT: 14 % (ref 0–6)
ETHANOL: <10 MG/DL (ref 0–0.08)
FENTANYL SCREEN, URINE: NOT DETECTED
GFR AFRICAN AMERICAN: >60
GFR NON-AFRICAN AMERICAN: >60 ML/MIN/1.73
GLUCOSE BLD-MCNC: 91 MG/DL (ref 74–99)
GLUCOSE URINE: NEGATIVE MG/DL
HCT VFR BLD CALC: 43.8 % (ref 37–54)
HEMOGLOBIN: 14.3 G/DL (ref 12.5–16.5)
IMMATURE GRANULOCYTES #: 0.1 E9/L
IMMATURE GRANULOCYTES %: 0.8 % (ref 0–5)
KETONES, URINE: NEGATIVE MG/DL
LEUKOCYTE ESTERASE, URINE: NEGATIVE
LYMPHOCYTES ABSOLUTE: 3.64 E9/L (ref 1.5–4)
LYMPHOCYTES RELATIVE PERCENT: 28.4 % (ref 20–42)
Lab: NORMAL
MCH RBC QN AUTO: 26.9 PG (ref 26–35)
MCHC RBC AUTO-ENTMCNC: 32.6 % (ref 32–34.5)
MCV RBC AUTO: 82.3 FL (ref 80–99.9)
METHADONE SCREEN, URINE: NOT DETECTED
MONOCYTES ABSOLUTE: 1.01 E9/L (ref 0.1–0.95)
MONOCYTES RELATIVE PERCENT: 7.9 % (ref 2–12)
NEUTROPHILS ABSOLUTE: 6.1 E9/L (ref 1.8–7.3)
NEUTROPHILS RELATIVE PERCENT: 47.6 % (ref 43–80)
NITRITE, URINE: NEGATIVE
OPIATE SCREEN URINE: NOT DETECTED
OXYCODONE URINE: NOT DETECTED
PDW BLD-RTO: 12.1 FL (ref 11.5–15)
PH UA: 6 (ref 5–9)
PHENCYCLIDINE SCREEN URINE: NOT DETECTED
PLATELET # BLD: 323 E9/L (ref 130–450)
PMV BLD AUTO: 9.4 FL (ref 7–12)
POTASSIUM REFLEX MAGNESIUM: 4.4 MMOL/L (ref 3.5–5)
PROTEIN UA: NEGATIVE MG/DL
RBC # BLD: 5.32 E12/L (ref 3.8–5.8)
SALICYLATE, SERUM: <0.3 MG/DL (ref 0–30)
SARS-COV-2, NAAT: NOT DETECTED
SODIUM BLD-SCNC: 142 MMOL/L (ref 132–146)
SPECIFIC GRAVITY UA: >=1.03 (ref 1–1.03)
TOTAL PROTEIN: 7.9 G/DL (ref 6.4–8.3)
TRICYCLIC ANTIDEPRESSANTS SCREEN SERUM: NEGATIVE NG/ML
UROBILINOGEN, URINE: 0.2 E.U./DL
WBC # BLD: 12.8 E9/L (ref 4.5–11.5)

## 2021-10-12 PROCEDURE — 93005 ELECTROCARDIOGRAM TRACING: CPT | Performed by: EMERGENCY MEDICINE

## 2021-10-12 PROCEDURE — 80053 COMPREHEN METABOLIC PANEL: CPT

## 2021-10-12 PROCEDURE — 87635 SARS-COV-2 COVID-19 AMP PRB: CPT

## 2021-10-12 PROCEDURE — 6370000000 HC RX 637 (ALT 250 FOR IP): Performed by: EMERGENCY MEDICINE

## 2021-10-12 PROCEDURE — 99285 EMERGENCY DEPT VISIT HI MDM: CPT

## 2021-10-12 PROCEDURE — 80143 DRUG ASSAY ACETAMINOPHEN: CPT

## 2021-10-12 PROCEDURE — 81003 URINALYSIS AUTO W/O SCOPE: CPT

## 2021-10-12 PROCEDURE — 80179 DRUG ASSAY SALICYLATE: CPT

## 2021-10-12 PROCEDURE — 80307 DRUG TEST PRSMV CHEM ANLYZR: CPT

## 2021-10-12 PROCEDURE — 82077 ASSAY SPEC XCP UR&BREATH IA: CPT

## 2021-10-12 PROCEDURE — 36415 COLL VENOUS BLD VENIPUNCTURE: CPT

## 2021-10-12 PROCEDURE — 85025 COMPLETE CBC W/AUTO DIFF WBC: CPT

## 2021-10-12 RX ORDER — LORAZEPAM 1 MG/1
1 TABLET ORAL ONCE
Status: COMPLETED | OUTPATIENT
Start: 2021-10-12 | End: 2021-10-12

## 2021-10-12 RX ORDER — LORAZEPAM 2 MG/ML
1 INJECTION INTRAMUSCULAR ONCE
Status: DISCONTINUED | OUTPATIENT
Start: 2021-10-12 | End: 2021-10-12

## 2021-10-12 RX ADMIN — LORAZEPAM 1 MG: 1 TABLET ORAL at 11:45

## 2021-10-12 ASSESSMENT — ENCOUNTER SYMPTOMS
SHORTNESS OF BREATH: 0
SINUS PRESSURE: 0
VOMITING: 0
BACK PAIN: 0
WHEEZING: 0
EYE REDNESS: 0
SORE THROAT: 0
DIARRHEA: 0
EYE PAIN: 0
EYE DISCHARGE: 0
COUGH: 0
ABDOMINAL PAIN: 0
NAUSEA: 0

## 2021-10-12 NOTE — ED PROVIDER NOTES
Patient is a 21 y/o male who presents to the ED with suicidal and homicidal ideations. Patient states that he had onset of suicidal and homicidal ideations last night. He states that he has been having these thoughts for the past 6 weeks when a family member  in front of him. He states that he has a plan to cut himself. He has no specific homicidal target. He does report a history of depression and admits to being noncompliant with his medications. Review of Systems   Constitutional: Negative for chills and fever. HENT: Negative for ear pain, sinus pressure and sore throat. Eyes: Negative for pain, discharge and redness. Respiratory: Negative for cough, shortness of breath and wheezing. Cardiovascular: Negative for chest pain. Gastrointestinal: Negative for abdominal pain, diarrhea, nausea and vomiting. Genitourinary: Negative for dysuria and frequency. Musculoskeletal: Negative for arthralgias and back pain. Skin: Negative for rash and wound. Neurological: Negative for weakness and headaches. Hematological: Negative for adenopathy. Psychiatric/Behavioral: Positive for behavioral problems and suicidal ideas. All other systems reviewed and are negative. Physical Exam  Vitals and nursing note reviewed. Constitutional:       General: He is not in acute distress. HENT:      Head: Normocephalic and atraumatic. Right Ear: External ear normal.      Left Ear: External ear normal.      Nose: Nose normal.      Mouth/Throat:      Mouth: Mucous membranes are moist.   Eyes:      Conjunctiva/sclera: Conjunctivae normal.      Pupils: Pupils are equal, round, and reactive to light. Cardiovascular:      Rate and Rhythm: Normal rate and regular rhythm. Heart sounds: No murmur heard. Pulmonary:      Effort: Pulmonary effort is normal. No respiratory distress. Breath sounds: Normal breath sounds. No stridor. No wheezing, rhonchi or rales.    Abdominal: General: Bowel sounds are normal. There is no distension. Palpations: Abdomen is soft. Tenderness: There is no abdominal tenderness. There is no guarding. Musculoskeletal:         General: Normal range of motion. Cervical back: Normal range of motion and neck supple. Skin:     General: Skin is warm and dry. Neurological:      Mental Status: He is alert and oriented to person, place, and time. Psychiatric:         Attention and Perception: Attention normal.         Mood and Affect: Mood is depressed. Affect is angry. Speech: Speech is rapid and pressured. Behavior: Behavior is agitated. Behavior is cooperative. Thought Content: Thought content includes homicidal and suicidal ideation. Thought content includes suicidal plan. Procedures     MDM                  EKG:  Normal sinus rhythm with ventricular rate of 82. IL interval, QRS duration and QT interval within normal range. Normal axis. No ST segment abnormalities to suggest acute ischemia.      --------------------------------------------- PAST HISTORY ---------------------------------------------  Past Medical History:  has a past medical history of Autism, History of suicidal tendencies, OCD (obsessive compulsive disorder), and [facility] Past Surgical History:  has no past surgical history on file. Social History:  reports that he has been smoking cigarettes. He has been smoking about 2.00 packs per day. He has never used smokeless tobacco. He reports previous alcohol use. He reports that he does not use drugs. Family History: family history is not on file. The patients home medications have been reviewed. Allergies: Patient has no known allergies.     -------------------------------------------------- RESULTS -------------------------------------------------    LABS:  Results for orders placed or performed during the hospital encounter of 10/12/21   COVID-19, Rapid    Specimen: Nasopharyngeal Swab   Result Value Ref Range    SARS-CoV-2, NAAT Not Detected Not Detected   CBC Auto Differential   Result Value Ref Range    WBC 12.8 (H) 4.5 - 11.5 E9/L    RBC 5.32 3.80 - 5.80 E12/L    Hemoglobin 14.3 12.5 - 16.5 g/dL    Hematocrit 43.8 37.0 - 54.0 %    MCV 82.3 80.0 - 99.9 fL    MCH 26.9 26.0 - 35.0 pg    MCHC 32.6 32.0 - 34.5 %    RDW 12.1 11.5 - 15.0 fL    Platelets 758 419 - 203 E9/L    MPV 9.4 7.0 - 12.0 fL    Neutrophils % 47.6 43.0 - 80.0 %    Immature Granulocytes % 0.8 0.0 - 5.0 %    Lymphocytes % 28.4 20.0 - 42.0 %    Monocytes % 7.9 2.0 - 12.0 %    Eosinophils % 14.0 (H) 0.0 - 6.0 %    Basophils % 1.3 0.0 - 2.0 %    Neutrophils Absolute 6.10 1.80 - 7.30 E9/L    Immature Granulocytes # 0.10 E9/L    Lymphocytes Absolute 3.64 1.50 - 4.00 E9/L    Monocytes Absolute 1.01 (H) 0.10 - 0.95 E9/L    Eosinophils Absolute 1.79 (H) 0.05 - 0.50 E9/L    Basophils Absolute 0.17 0.00 - 0.20 E9/L   Comprehensive Metabolic Panel w/ Reflex to MG   Result Value Ref Range    Sodium 142 132 - 146 mmol/L    Potassium reflex Magnesium 4.4 3.5 - 5.0 mmol/L    Chloride 106 98 - 107 mmol/L    CO2 23 22 - 29 mmol/L    Anion Gap 13 7 - 16 mmol/L    Glucose 91 74 - 99 mg/dL    BUN 14 6 - 20 mg/dL    CREATININE 1.0 0.7 - 1.2 mg/dL    GFR Non-African American >60 >=60 mL/min/1.73    GFR African American >60     Calcium 9.7 8.6 - 10.2 mg/dL    Total Protein 7.9 6.4 - 8.3 g/dL    Albumin 4.8 3.5 - 5.2 g/dL    Total Bilirubin <0.2 0.0 - 1.2 mg/dL    Alkaline Phosphatase 110 40 - 129 U/L     (H) 0 - 40 U/L    AST 61 (H) 0 - 39 U/L   Serum Drug Screen   Result Value Ref Range    Ethanol Lvl <10 mg/dL    Acetaminophen Level <5.0 (L) 10.0 - 15.6 mcg/mL    Salicylate, Serum <1.4 0.0 - 30.0 mg/dL   Urine Drug Screen   Result Value Ref Range    Amphetamine Screen, Urine NOT DETECTED Negative <1000 ng/mL    Barbiturate Screen, Ur NOT DETECTED Negative < 200 ng/mL    Benzodiazepine Screen, Urine NOT DETECTED Negative < 200 ng/mL    Cannabinoid Scrn, Ur NOT DETECTED Negative < 50ng/mL    Cocaine Metabolite Screen, Urine NOT DETECTED Negative < 300 ng/mL    Opiate Scrn, Ur NOT DETECTED Negative < 300ng/mL    PCP Screen, Urine NOT DETECTED Negative < 25 ng/mL    Methadone Screen, Urine NOT DETECTED Negative <300 ng/mL    Oxycodone Urine NOT DETECTED Negative <100 ng/mL    FENTANYL SCREEN, URINE NOT DETECTED Negative <1 ng/mL    Drug Screen Comment: see below    Urinalysis   Result Value Ref Range    Color, UA Yellow Straw/Yellow    Clarity, UA Clear Clear    Glucose, Ur Negative Negative mg/dL    Bilirubin Urine Negative Negative    Ketones, Urine Negative Negative mg/dL    Specific Gravity, UA >=1.030 1.005 - 1.030    Blood, Urine Negative Negative    pH, UA 6.0 5.0 - 9.0    Protein, UA Negative Negative mg/dL    Urobilinogen, Urine 0.2 <2.0 E.U./dL    Nitrite, Urine Negative Negative    Leukocyte Esterase, Urine Negative Negative       RADIOLOGY:  No orders to display         ------------------------- NURSING NOTES AND VITALS REVIEWED ---------------------------  Date / Time Roomed:  10/12/2021 12:40 AM  ED Bed Assignment:  06/06    The nursing notes within the ED encounter and vital signs as below have been reviewed. Patient Vitals for the past 24 hrs:   BP Temp Temp src Pulse Resp SpO2 Height Weight   10/12/21 0030 (!) 148/69 97.3 °F (36.3 °C) Infrared 91 18 97 % 5' 10\" (1.778 m) 180 lb (81.6 kg)       Oxygen Saturation Interpretation: Normal    ------------------------------------------ PROGRESS NOTES ------------------------------------------  Re-evaluation(s):  Time: 0315. Patients symptoms show no change  Repeat physical examination is not changed    Counseling:  I have spoken with the patient and discussed todays results, in addition to providing specific details for the plan of care and counseling regarding the diagnosis and prognosis.   Their questions are answered at this time and they are agreeable with the plan of admission.    --------------------------------- ADDITIONAL PROVIDER NOTES ---------------------------------  Consultations: This patient's ED course included: a personal history and physicial examination and re-evaluation prior to disposition    This patient has remained hemodynamically stable during their ED course. Diagnosis:  1. Depression with suicidal ideation    2. Homicidal ideations        Disposition:  Patient's disposition: Admit to mental health unit - medically cleared for admission  Patient's condition is stable.          [address] [facility],   10/18/21 7434

## 2021-10-12 NOTE — ED PROVIDER NOTES
FIRST PROVIDER CONTACT ASSESSMENT NOTE                                                                                                Department of Emergency Medicine                                                      First Provider Note  10/12/21  12:41 AM EDT  NAME: Keshawn Silva  : 2000  MRN: [id number]    Chief Complaint: No chief complaint on file. History of Present Illness:   Keshawn Silva is a 22 y.o. male who presents to the ED for suicidal or homicidal thoughts. States he was thinking about cutting himself. Also wants to hurt \"anybody and everybody\"   Denies drugs or alcohol. States he doesn't have anywhere to live. Was brought here today by a friend. Reports he has been hospitalized for the same. Has been seen here as well. States he has had these thoughts for several days. Focused Physical Exam:  VS:    ED Triage Vitals [10/12/21 0030]   BP Temp Temp Source Pulse Resp SpO2 Height Weight   -- 97.3 °F (36.3 °C) Infrared 112 22 97 % 5' 10\" (1.778 m) 180 lb (81.6 kg)        General: Alert and in no apparent distress. Medical History:  has a past medical history of Autism and OCD (obsessive compulsive disorder). Surgical History:  has no past surgical history on file. Social History:  reports that he has been smoking. He has never used smokeless tobacco. He reports previous alcohol use. He reports that he does not use drugs. Family History: family history is not on file. Allergies: Patient has no known allergies.      Initial Plan of Care:  Initiate Treatment-Testing, Proceed toTreatment Area When Bed Available for ED Attending/MLP to Continue Care    -------------------------------------------------END OF FIRST PROVIDER CONTACT ASSESSMENT NOTE--------------------------------------------------------  Electronically signed by Steve Malhotra PA-C   DD: 10/12/21       Steve Malhotra PA-C  10/12/21 0043    ATTENDING PROVIDER ATTESTATION: Supervising Physician, on-site, available for consultation, non-participatory in the evaluation or care of this patient.            [address] [facility],   10/12/21 8577
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
## 2021-10-12 NOTE — ED NOTES
Patient c/o feeling anxious like he wants to hurt himself and to hurt other people as well. Dr. Stepahnie Lee made aware and new order received.      Jered Szymanski, RN  10/12/21 [address] [place] RN  10/12/21 1141

## 2021-10-12 NOTE — ED NOTES
RAYMON BURDICK is aware that Pt is in need of assessment. Pt will be assessed in order of log placement. Pt will need to have and EKG and medical clearance.     Thank you    RAYMON Canada, MSW, LSW  10/12/21 2385

## 2021-10-13 VITALS
HEIGHT: 70 IN | WEIGHT: 180 LBS | SYSTOLIC BLOOD PRESSURE: 99 MMHG | TEMPERATURE: 97.8 F | BODY MASS INDEX: 25.77 KG/M2 | DIASTOLIC BLOOD PRESSURE: 60 MMHG | RESPIRATION RATE: 16 BRPM | OXYGEN SATURATION: 96 % | HEART RATE: 65 BPM

## 2021-10-13 NOTE — ED NOTES
Assuming care of patient, sleeping at this time, tv by patient, lights dimmed, bed in low position, CO at door with no new concerns nor issues at this time.       Silvia Bernal RN  10/13/21 9131

## 2021-10-13 NOTE — ED NOTES
COVID SCREENING FORM FAXED TO GENERATIONS; WILL FAX PINK SLIP WHEN PATIENT IS ACCEPTED     Mark Chan  10/12/21 3148

## 2021-10-13 NOTE — ED NOTES
Patient continues to rest in bed, does awaken for a moment and denies any new need nor concern, still states + SI/HI. Rolls back over and closes eyes. CO at door with no concerns at this time.       Dougie Membreno RN  10/13/21 6976

## 2021-10-13 NOTE — ED NOTES
Patient continues to rest at this time, no further at this time.       Catrachito Valdes RN  10/13/21 9073

## 2022-01-21 ENCOUNTER — HOSPITAL ENCOUNTER (EMERGENCY)
Age: 22
Discharge: PSYCHIATRIC OR SUBSTANCE USE FACILITY | End: 2022-01-22
Attending: STUDENT IN AN ORGANIZED HEALTH CARE EDUCATION/TRAINING PROGRAM
Payer: MEDICAID

## 2022-01-21 DIAGNOSIS — R45.851 SUICIDAL IDEATION: Primary | ICD-10-CM

## 2022-01-21 LAB
A/G RATIO: 2.1 (ref 1.1–2.2)
ACETAMINOPHEN LEVEL: <5 UG/ML (ref 10–30)
ALBUMIN SERPL-MCNC: 4.9 G/DL (ref 3.4–5)
ALP BLD-CCNC: 101 U/L (ref 40–129)
ALT SERPL-CCNC: 95 U/L (ref 10–40)
AMPHETAMINE SCREEN, URINE: NORMAL
ANION GAP SERPL CALCULATED.3IONS-SCNC: 11 MMOL/L (ref 3–16)
AST SERPL-CCNC: 46 U/L (ref 15–37)
BARBITURATE SCREEN URINE: NORMAL
BASOPHILS ABSOLUTE: 0 K/UL (ref 0–0.2)
BASOPHILS RELATIVE PERCENT: 0.4 %
BENZODIAZEPINE SCREEN, URINE: NORMAL
BILIRUB SERPL-MCNC: <0.2 MG/DL (ref 0–1)
BUN BLDV-MCNC: 12 MG/DL (ref 7–20)
CALCIUM SERPL-MCNC: 9.3 MG/DL (ref 8.3–10.6)
CANNABINOID SCREEN URINE: NORMAL
CHLORIDE BLD-SCNC: 108 MMOL/L (ref 99–110)
CO2: 24 MMOL/L (ref 21–32)
COCAINE METABOLITE SCREEN URINE: NORMAL
CREAT SERPL-MCNC: 0.8 MG/DL (ref 0.9–1.3)
EOSINOPHILS ABSOLUTE: 0.3 K/UL (ref 0–0.6)
EOSINOPHILS RELATIVE PERCENT: 3.9 %
ETHANOL: NORMAL MG/DL (ref 0–0.08)
GFR AFRICAN AMERICAN: >60
GFR NON-AFRICAN AMERICAN: >60
GLUCOSE BLD-MCNC: 98 MG/DL (ref 70–99)
HCT VFR BLD CALC: 43.7 % (ref 40.5–52.5)
HEMOGLOBIN: 14.5 G/DL (ref 13.5–17.5)
LYMPHOCYTES ABSOLUTE: 3 K/UL (ref 1–5.1)
LYMPHOCYTES RELATIVE PERCENT: 35.2 %
Lab: NORMAL
MCH RBC QN AUTO: 27.9 PG (ref 26–34)
MCHC RBC AUTO-ENTMCNC: 33.3 G/DL (ref 31–36)
MCV RBC AUTO: 84 FL (ref 80–100)
METHADONE SCREEN, URINE: NORMAL
MONOCYTES ABSOLUTE: 0.9 K/UL (ref 0–1.3)
MONOCYTES RELATIVE PERCENT: 10.4 %
NEUTROPHILS ABSOLUTE: 4.3 K/UL (ref 1.7–7.7)
NEUTROPHILS RELATIVE PERCENT: 50.1 %
OPIATE SCREEN URINE: NORMAL
OXYCODONE URINE: NORMAL
PDW BLD-RTO: 13.6 % (ref 12.4–15.4)
PH UA: 4
PHENCYCLIDINE SCREEN URINE: NORMAL
PLATELET # BLD: 268 K/UL (ref 135–450)
PMV BLD AUTO: 7.8 FL (ref 5–10.5)
POTASSIUM REFLEX MAGNESIUM: 4.4 MMOL/L (ref 3.5–5.1)
PROPOXYPHENE SCREEN: NORMAL
RBC # BLD: 5.2 M/UL (ref 4.2–5.9)
SALICYLATE, SERUM: <0.3 MG/DL (ref 15–30)
SARS-COV-2, NAAT: NOT DETECTED
SODIUM BLD-SCNC: 143 MMOL/L (ref 136–145)
TOTAL PROTEIN: 7.2 G/DL (ref 6.4–8.2)
WBC # BLD: 8.6 K/UL (ref 4–11)

## 2022-01-21 PROCEDURE — 80143 DRUG ASSAY ACETAMINOPHEN: CPT

## 2022-01-21 PROCEDURE — 99285 EMERGENCY DEPT VISIT HI MDM: CPT

## 2022-01-21 PROCEDURE — 80307 DRUG TEST PRSMV CHEM ANLYZR: CPT

## 2022-01-21 PROCEDURE — 80179 DRUG ASSAY SALICYLATE: CPT

## 2022-01-21 PROCEDURE — 87635 SARS-COV-2 COVID-19 AMP PRB: CPT

## 2022-01-21 PROCEDURE — 93005 ELECTROCARDIOGRAM TRACING: CPT | Performed by: STUDENT IN AN ORGANIZED HEALTH CARE EDUCATION/TRAINING PROGRAM

## 2022-01-21 PROCEDURE — 6370000000 HC RX 637 (ALT 250 FOR IP): Performed by: STUDENT IN AN ORGANIZED HEALTH CARE EDUCATION/TRAINING PROGRAM

## 2022-01-21 PROCEDURE — 80053 COMPREHEN METABOLIC PANEL: CPT

## 2022-01-21 PROCEDURE — 85025 COMPLETE CBC W/AUTO DIFF WBC: CPT

## 2022-01-21 PROCEDURE — 36415 COLL VENOUS BLD VENIPUNCTURE: CPT

## 2022-01-21 PROCEDURE — 82077 ASSAY SPEC XCP UR&BREATH IA: CPT

## 2022-01-21 RX ORDER — ARIPIPRAZOLE 400 MG
KIT INTRAMUSCULAR
Status: ON HOLD | COMMUNITY
Start: 2021-11-15 | End: 2022-10-05 | Stop reason: HOSPADM

## 2022-01-21 RX ORDER — LEVETIRACETAM 250 MG/1
500 TABLET ORAL 2 TIMES DAILY
Status: DISCONTINUED | OUTPATIENT
Start: 2022-01-21 | End: 2022-01-22 | Stop reason: HOSPADM

## 2022-01-21 RX ORDER — DIVALPROEX SODIUM 500 MG/1
500 TABLET, DELAYED RELEASE ORAL 2 TIMES DAILY
COMMUNITY
Start: 2021-12-02

## 2022-01-21 RX ADMIN — LEVETIRACETAM 500 MG: 250 TABLET, FILM COATED ORAL at 23:12

## 2022-01-22 VITALS
DIASTOLIC BLOOD PRESSURE: 79 MMHG | HEIGHT: 66 IN | SYSTOLIC BLOOD PRESSURE: 145 MMHG | RESPIRATION RATE: 17 BRPM | WEIGHT: 224.1 LBS | OXYGEN SATURATION: 96 % | TEMPERATURE: 98.3 F | BODY MASS INDEX: 36.02 KG/M2 | HEART RATE: 77 BPM

## 2022-01-22 LAB
EKG ATRIAL RATE: 91 BPM
EKG DIAGNOSIS: NORMAL
EKG P AXIS: 48 DEGREES
EKG P-R INTERVAL: 122 MS
EKG Q-T INTERVAL: 330 MS
EKG QRS DURATION: 86 MS
EKG QTC CALCULATION (BAZETT): 405 MS
EKG R AXIS: 87 DEGREES
EKG T AXIS: 33 DEGREES
EKG VENTRICULAR RATE: 91 BPM

## 2022-01-22 NOTE — ED PROVIDER NOTES
[address] ENCOUNTER          ATTENDING PHYSICIAN NOTE       Date of evaluation: 2022    Chief Complaint     Suicidal      History of Present Illness     Damian Welch is a 22 y.o. male who presents with suicidal ideation    Patient tells me that he recently experienced the loss of his sister, who apparently  from some type of COVID related complication he tells me. Last night he began having suicidal thoughts. He is from [facility] but is in the [place] area with a friend, Gorge Ahn. He tells me that he has not taken any action to harm himself but is having thoughts of jumping off a bridge. He does not have any homicidal ideations. He does not have any auditory or visual hallucinations. He has not used any medications. Says he has been noncompliant with his seizure medications and his antidepressants. He does not have any medical complaints and his review of systems is negative as below. PMHx: autism spectrum disorder, recurrent major depressive disorder, ? Seizures  SH: +tobacco, prior alcohol, no illicits, and as below    Review of Systems       ROS:   Positive  as per HPI. Negative for:    -Constitutional: fevers, chills    -Eyes:   eye pain, eye discharge    -Ears/Nose/Throat: Ear pain, ear discharge    -Cardiovascular: CP, cyanosis    -Respiratory:  cough, SOB    -Gastrointestinal: Abd pain, nausea, vomiting, melena, hematochezia    -Genitourinary: hematuria, dysuria, urinary frequency    -Neurological: numbness or weakness    -Skin:   Rash, pruritis,     -Hematologic: easy bleeding, easy bruising    -Musculoskeletal:  joint swelling, joint redness    Past Medical, Surgical, Family, and Social History     He has a past medical history of Autism, History of suicidal tendencies, OCD (obsessive compulsive disorder), and Seizures ([facility] He has no past surgical history on file. His family history is not on file. He reports that he has been smoking cigarettes. He has been smoking about 2.00 packs per day. He has never used smokeless tobacco. He reports previous alcohol use. He reports that he does not use drugs. Medications     Discharge Medication List as of 1/22/2022  8:01 AM      CONTINUE these medications which have NOT CHANGED    Details   ABILIFY MAINTENA 400 MG SRER DAWHistorical Med      divalproex (DEPAKOTE) 500 MG DR tablet Historical Med      sertraline (ZOLOFT) 50 MG tablet Historical Med      levETIRAcetam (KEPPRA) 500 MG tablet Take 1 tablet by mouth 2 times daily, Disp-60 tablet, R-0Print      venlafaxine (EFFEXOR) 100 MG tablet Take 100 mg by mouth 3 times dailyHistorical Med      buPROPion (WELLBUTRIN XL) 150 MG extended release tablet Take 1 tablet by mouth daily, Disp-30 tablet, R-3Normal             Allergies     He has No Known Allergies. Physical Exam     INITIAL VITALS: BP: (!) 158/76, Temp: 98.3 °F (36.8 °C), Pulse: 96, Resp: 28, SpO2: 96 %     General:  Well appearing. No acute distress. Non-toxic appearing    Eyes:  Pupils equally round, reactive, brisk. No discharge from eyes. ENT:  No discharge from nose. OP clear. Neck:  Supple. Nontender. Pulmonary:   Non-labored breathing. Breath sounds clear bilaterally. Cardiac:  Regular rate and rhythm. No murmurs. Abdomen:  Soft. Non-tender. Non-distended. No masses. Musculoskeletal:  No long bone deformity. No ankle or wrist deformity. Back: midline spine nontender to palpation, no stepoffs or deformity  Vascular:  Extremities warm and perfused. Radial pulses 2+ bilaterally. Dorsalis pedis pulses 2+ bilaterally. Skin:  No rash. Warm. Neuro: Alert and oriented x 3. CN II-XII grossly intact. Speech and mentation normal.    Sensation grossly intact to light touch. Mild psychomotor slowing. Flat affect. Depressed mood. No evidence of response to internal stimuli. Gait normal, non ataxic    Extremities:  No peripheral edema. LE symmetric.     Diagnostic Results     EKG Indication concern for ingestion     EKG Interpretation     Interpreted by me (emergency department physician)     Rhythm: normal sinus   Rate: 91  Axis: normal (87)  Ectopy: none  Conduction: normal  ST Segments: no significant elevation or depression  T Waves: nonspecific pattern  Q Waves: nonspecific pattern     Clinical Impression:   Normal sinus rhythm. This is a non-specific EKG with no significant change compared to the prior EKG dated 10/12/21. There is no evidence of significant interval prolongation. There is no evidence of acute ischemia. No terminal R wave progression. [facility]    RADIOLOGY:  No orders to display       LABS:   Results for orders placed or performed during the hospital encounter of 01/21/22   COVID-19, Rapid    Specimen: Nasopharyngeal Swab   Result Value Ref Range    SARS-CoV-2, NAAT Not Detected Not Detected   CBC Auto Differential   Result Value Ref Range    WBC 8.6 4.0 - 11.0 K/uL    RBC 5.20 4. 20 - 5.90 M/uL    Hemoglobin 14.5 13.5 - 17.5 g/dL    Hematocrit 43.7 40.5 - 52.5 %    MCV 84.0 80.0 - 100.0 fL    MCH 27.9 26.0 - 34.0 pg    MCHC 33.3 31.0 - 36.0 g/dL    RDW 13.6 12.4 - 15.4 %    Platelets 013 246 - 351 K/uL    MPV 7.8 5.0 - 10.5 fL    Neutrophils % 50.1 %    Lymphocytes % 35.2 %    Monocytes % 10.4 %    Eosinophils % 3.9 %    Basophils % 0.4 %    Neutrophils Absolute 4.3 1.7 - 7.7 K/uL    Lymphocytes Absolute 3.0 1.0 - 5.1 K/uL    Monocytes Absolute 0.9 0.0 - 1.3 K/uL    Eosinophils Absolute 0.3 0.0 - 0.6 K/uL    Basophils Absolute 0.0 0.0 - 0.2 K/uL   Comprehensive Metabolic Panel w/ Reflex to MG   Result Value Ref Range    Sodium 143 136 - 145 mmol/L    Potassium reflex Magnesium 4.4 3.5 - 5.1 mmol/L    Chloride 108 99 - 110 mmol/L    CO2 24 21 - 32 mmol/L    Anion Gap 11 3 - 16    Glucose 98 70 - 99 mg/dL    BUN 12 7 - 20 mg/dL    CREATININE 0.8 (L) 0.9 - 1.3 mg/dL    GFR Non-African American >60 >60    GFR African American >60 >60    Calcium 9.3 8.3 - 10.6 mg/dL    Total Protein 7.2 6.4 - 8.2 g/dL    Albumin 4.9 3.4 - 5.0 g/dL    Albumin/Globulin Ratio 2.1 1.1 - 2.2    Total Bilirubin <0.2 0.0 - 1.0 mg/dL    Alkaline Phosphatase 101 40 - 129 U/L    ALT 95 (H) 10 - 40 U/L    AST 46 (H) 15 - 37 U/L   Salicylate   Result Value Ref Range    Salicylate, Serum <4.9 (L) 15.0 - 30.0 mg/dL   Acetaminophen Level   Result Value Ref Range    Acetaminophen Level <5 (L) 10 - 30 ug/mL   Ethanol   Result Value Ref Range    Ethanol Lvl None Detected mg/dL   Urine Drug Screen   Result Value Ref Range    Amphetamine Screen, Urine Neg Negative <1000ng/mL    Barbiturate Screen, Ur Neg Negative <200 ng/mL    Benzodiazepine Screen, Urine Neg Negative <200 ng/mL    Cannabinoid Scrn, Ur Neg Negative <50 ng/mL    Cocaine Metabolite Screen, Urine Neg Negative <300 ng/mL    Opiate Scrn, Ur Neg Negative <300 ng/mL    PCP Screen, Urine Neg Negative <25 ng/mL    Methadone Screen, Urine Neg Negative <300 ng/mL    Propoxyphene Scrn, Ur Neg Negative <300 ng/mL    Oxycodone Urine Neg Negative <100 ng/ml    pH, UA 4.0     Drug Screen Comment: see below    EKG 12 Lead   Result Value Ref Range    Ventricular Rate 91 BPM    Atrial Rate 91 BPM    P-R Interval 122 ms    QRS Duration 86 ms    Q-T Interval 330 ms    QTc Calculation (Bazett) 405 ms    P Axis 48 degrees    R Axis 87 degrees    T Axis 33 degrees    Diagnosis       EKG performed in ER and to be interpreted by ER physician. Confirmed by MD, ER ([id number]),  Philippe Alicia ([phone]) on 1/22/2022 7:54:54 AM       ED BEDSIDE ULTRASOUND:  None performed    Procedures     None performed    ED Course     Nursing Notes, Past Medical Hx, Past Surgical Hx, Social Hx, Allergies, and Family Hx were reviewed.     The patient was given the following medications:  Orders Placed This Encounter   Medications    DISCONTD: levETIRAcetam (KEPPRA) tablet 500 mg       CONSULTS:  None    MEDICAL DECISIONMAKING / ASSESSMENT / Gloria Janette is a 22 y.o. male with suicidal ideations. Pt was hemodynamically stable and afebrile in the Emergency Department. I administered Keppra at the patient's home dose. The patient is medically cleared for further psychiatric evaluation as he has no complaints or signs on exam.  Screening laboratory studies were obtained per protocol. These were unremarkable. Specifically salicylate, acetaminophen, and ethanol levels were undetectable. There is no electrolyte abnormality or evidence of acute kidney injury. There is no leukocytosis to suggest infection. There is no new anemia or thrombocytosis. The urine drug screen is unremarkable. Coronavirus testing is unremarkable. He does have a mild transaminitis, but endorses some alcohol use, although he did says he has not had any alcohol today. As the patient appears to be under significant situational stressor, demonstrates signs and exam consistent with depression, and endorses suicidal ideation with plan, I do feel that he requires further evaluation by psychiatric center and do feel that he meets criteria for an involuntary hold, which I signed. Clinical Impression     1. Suicidal ideations    Disposition     PATIENT REFERRED TO:  No follow-up provider specified.     DISCHARGE MEDICATIONS:  Discharge Medication List as of 1/22/2022  8:01 AM          DISPOSITION Decision To Transfer 01/22/2022 12:46:06 AM  Transfer to mental health facility     Chase Singh MD  01/22/22 0010    Addendum 01/25/2022 2755  Added chief complaint     Chase Singh MD  01/25/22 0153

## 2022-02-03 ENCOUNTER — HOSPITAL ENCOUNTER (EMERGENCY)
Age: 22
Discharge: PSYCHIATRIC OR SUBSTANCE USE FACILITY | End: 2022-02-03
Attending: EMERGENCY MEDICINE
Payer: COMMERCIAL

## 2022-02-03 VITALS
TEMPERATURE: 98 F | HEIGHT: 68 IN | RESPIRATION RATE: 17 BRPM | OXYGEN SATURATION: 98 % | WEIGHT: 224 LBS | SYSTOLIC BLOOD PRESSURE: 115 MMHG | DIASTOLIC BLOOD PRESSURE: 71 MMHG | BODY MASS INDEX: 33.95 KG/M2 | HEART RATE: 105 BPM

## 2022-02-03 DIAGNOSIS — R45.851 SUICIDAL IDEATION: Primary | ICD-10-CM

## 2022-02-03 LAB
ACETAMINOPHEN LEVEL: <5 MCG/ML (ref 10–30)
ALBUMIN SERPL-MCNC: 4.7 G/DL (ref 3.5–5.2)
ALP BLD-CCNC: 92 U/L (ref 40–129)
ALT SERPL-CCNC: 101 U/L (ref 0–40)
AMPHETAMINE SCREEN, URINE: NOT DETECTED
ANION GAP SERPL CALCULATED.3IONS-SCNC: 11 MMOL/L (ref 7–16)
AST SERPL-CCNC: 55 U/L (ref 0–39)
BARBITURATE SCREEN URINE: NOT DETECTED
BASOPHILS ABSOLUTE: 0.04 E9/L (ref 0–0.2)
BASOPHILS RELATIVE PERCENT: 0.5 % (ref 0–2)
BENZODIAZEPINE SCREEN, URINE: NOT DETECTED
BILIRUB SERPL-MCNC: 0.4 MG/DL (ref 0–1.2)
BUN BLDV-MCNC: 10 MG/DL (ref 6–20)
CALCIUM SERPL-MCNC: 9.5 MG/DL (ref 8.6–10.2)
CANNABINOID SCREEN URINE: NOT DETECTED
CHLORIDE BLD-SCNC: 105 MMOL/L (ref 98–107)
CO2: 25 MMOL/L (ref 22–29)
COCAINE METABOLITE SCREEN URINE: NOT DETECTED
CREAT SERPL-MCNC: 0.9 MG/DL (ref 0.7–1.2)
EKG ATRIAL RATE: 81 BPM
EKG P AXIS: 29 DEGREES
EKG P-R INTERVAL: 128 MS
EKG Q-T INTERVAL: 348 MS
EKG QRS DURATION: 86 MS
EKG QTC CALCULATION (BAZETT): 404 MS
EKG R AXIS: 107 DEGREES
EKG T AXIS: 8 DEGREES
EKG VENTRICULAR RATE: 81 BPM
EOSINOPHILS ABSOLUTE: 0.28 E9/L (ref 0.05–0.5)
EOSINOPHILS RELATIVE PERCENT: 3.5 % (ref 0–6)
ETHANOL: <10 MG/DL (ref 0–0.08)
FENTANYL SCREEN, URINE: NOT DETECTED
GFR AFRICAN AMERICAN: >60
GFR NON-AFRICAN AMERICAN: >60 ML/MIN/1.73
GLUCOSE BLD-MCNC: 100 MG/DL (ref 74–99)
HCT VFR BLD CALC: 43.5 % (ref 37–54)
HEMOGLOBIN: 14.2 G/DL (ref 12.5–16.5)
IMMATURE GRANULOCYTES #: 0.03 E9/L
IMMATURE GRANULOCYTES %: 0.4 % (ref 0–5)
INFLUENZA A: NOT DETECTED
INFLUENZA B: NOT DETECTED
LYMPHOCYTES ABSOLUTE: 2.43 E9/L (ref 1.5–4)
LYMPHOCYTES RELATIVE PERCENT: 30.6 % (ref 20–42)
Lab: NORMAL
MCH RBC QN AUTO: 27.2 PG (ref 26–35)
MCHC RBC AUTO-ENTMCNC: 32.6 % (ref 32–34.5)
MCV RBC AUTO: 83.3 FL (ref 80–99.9)
METHADONE SCREEN, URINE: NOT DETECTED
MONOCYTES ABSOLUTE: 0.58 E9/L (ref 0.1–0.95)
MONOCYTES RELATIVE PERCENT: 7.3 % (ref 2–12)
NEUTROPHILS ABSOLUTE: 4.57 E9/L (ref 1.8–7.3)
NEUTROPHILS RELATIVE PERCENT: 57.7 % (ref 43–80)
OPIATE SCREEN URINE: NOT DETECTED
OXYCODONE URINE: NOT DETECTED
PDW BLD-RTO: 12.6 FL (ref 11.5–15)
PHENCYCLIDINE SCREEN URINE: NOT DETECTED
PLATELET # BLD: 281 E9/L (ref 130–450)
PMV BLD AUTO: 9.6 FL (ref 7–12)
POTASSIUM SERPL-SCNC: 3.9 MMOL/L (ref 3.5–5)
RBC # BLD: 5.22 E12/L (ref 3.8–5.8)
SALICYLATE, SERUM: <0.3 MG/DL (ref 0–30)
SARS-COV-2 RNA, RT PCR: NOT DETECTED
SODIUM BLD-SCNC: 141 MMOL/L (ref 132–146)
TOTAL PROTEIN: 7.5 G/DL (ref 6.4–8.3)
TRICYCLIC ANTIDEPRESSANTS SCREEN SERUM: NEGATIVE NG/ML
WBC # BLD: 7.9 E9/L (ref 4.5–11.5)

## 2022-02-03 PROCEDURE — 93005 ELECTROCARDIOGRAM TRACING: CPT | Performed by: EMERGENCY MEDICINE

## 2022-02-03 PROCEDURE — 80179 DRUG ASSAY SALICYLATE: CPT

## 2022-02-03 PROCEDURE — 87636 SARSCOV2 & INF A&B AMP PRB: CPT

## 2022-02-03 PROCEDURE — 36415 COLL VENOUS BLD VENIPUNCTURE: CPT

## 2022-02-03 PROCEDURE — 93010 ELECTROCARDIOGRAM REPORT: CPT | Performed by: INTERNAL MEDICINE

## 2022-02-03 PROCEDURE — 85025 COMPLETE CBC W/AUTO DIFF WBC: CPT

## 2022-02-03 PROCEDURE — 82077 ASSAY SPEC XCP UR&BREATH IA: CPT

## 2022-02-03 PROCEDURE — 99285 EMERGENCY DEPT VISIT HI MDM: CPT

## 2022-02-03 PROCEDURE — 80143 DRUG ASSAY ACETAMINOPHEN: CPT

## 2022-02-03 PROCEDURE — 80053 COMPREHEN METABOLIC PANEL: CPT

## 2022-02-03 PROCEDURE — 80307 DRUG TEST PRSMV CHEM ANLYZR: CPT

## 2022-02-03 NOTE — ED NOTES
Delta Elliott called from [facility]. [facility]. Lynn Herrmann, Adult unit  [phone]  She will call back with ETA of EMS.       Marci Delatorre, TYESHA  02/03/22 5880
Marcio Party with [facility] called. Physicians ETA is 90 minutes to 2 hours, therefore around 1400. Updated RN and provided report number to RN as well. Transfer packet and EMS packet completed for transfer.       TYESHA London  02/03/22 2744
Patient is now medically cleared, referral made to [facility], Ely Irizarry. They will begin referral process.       TYESHA Kramer  02/03/22 1126
Pt belongings, 2 bags in [address]  02/03/22 1016
Report celled to Angel Arias RN at [facility] .  Physician here to  patient      Sean TONO Sullivan  02/03/22 6032
protection taken out against you? []  Yes [x]  No  3. Have you ever been arrested due to violence? []  Yes [x]  No  4. Have you ever been cruel to animals? []  Yes [x]  No    If any of the above questions are affirmative, please complete these questions:  1. Have you ever thought about hurting someone? [x]  No  []  Yes (Ask the questions listed below)   When?  Did you follow through with the thoughts? []  No  []  Yes - What happened? 2.  Have you ever threatened anyone? [x]  No  []  Yes (Ask the questions listed below)   When and what happened?  Have you ever threatened someone with a gun, knife or other weapon? []  No  []  Yes - When and what happened? 3. Have you ever physically hurt someone? [x]  No  []  Yes (Ask the questions listed below)   When and what happened?  How many times have you physically hurt someone in the past?    Hallucinations/Delusions   [] Reports:   [x] Denies     Substance Use/Alcohol Use/Addiction: SBIRT Screen Complete. [] Reports:   [x] Denies     Trauma History  [x] Reports: Reports sister passed last week, struggling to cope. [] Denies     Collateral Information: None collected at this time. Level of Care/Disposition Plan  [] Home:   [] Outpatient Provider:   [] Crisis Unit:   [x] Inpatient Psychiatric Unit: Patient was pink slipped by ED Physician and will need referred to [facility] once medically cleared.    [] Other:        TYESHA Jones  02/03/22 0361

## 2022-02-03 NOTE — ED PROVIDER NOTES
HPI:  2/3/22, Time: 10:48 AM MARC Giles is a 22 y.o. male presenting to the ED for suicidal ideation. Patient wants to kill himself. He is depressed that his sister passed away recently. He wants to jump in oncoming traffic. He almost did it last night. Does have a previous history of overdosing to kill himself. No homicidal lesion. No hallucination. No other symptoms or complaints. Review of Systems:   A complete review of systems was performed and pertinent positives and negatives are stated within HPI, all other systems reviewed and are negative.          --------------------------------------------- PAST HISTORY ---------------------------------------------  Past Medical History:  has a past medical history of Autism, History of suicidal tendencies, OCD (obsessive compulsive disorder), and [facility] Past Surgical History:  has no past surgical history on file. Social History:  reports that he has been smoking cigarettes. He has been smoking about 1.00 pack per day. He has never used smokeless tobacco. He reports previous alcohol use. He reports that he does not use drugs. Family History: family history is not on file. The patients home medications have been reviewed. Allergies: Patient has no known allergies.     -------------------------------------------------- RESULTS -------------------------------------------------  All laboratory and radiology results have been personally reviewed by myself   LABS:  Results for orders placed or performed during the hospital encounter of 02/03/22   COVID-19 & Influenza Combo    Specimen: Nasopharyngeal Swab   Result Value Ref Range    SARS-CoV-2 RNA, RT PCR NOT DETECTED NOT DETECTED    INFLUENZA A NOT DETECTED NOT DETECTED    INFLUENZA B NOT DETECTED NOT DETECTED   Comprehensive Metabolic Panel   Result Value Ref Range    Sodium 141 132 - 146 mmol/L    Potassium 3.9 3.5 - 5.0 mmol/L    Chloride 105 98 - 107 mmol/L    CO2 25 22 - 29 mmol/L    Anion Gap 11 7 - 16 mmol/L    Glucose 100 (H) 74 - 99 mg/dL    BUN 10 6 - 20 mg/dL    CREATININE 0.9 0.7 - 1.2 mg/dL    GFR Non-African American >60 >=60 mL/min/1.73    GFR African American >60     Calcium 9.5 8.6 - 10.2 mg/dL    Total Protein 7.5 6.4 - 8.3 g/dL    Albumin 4.7 3.5 - 5.2 g/dL    Total Bilirubin 0.4 0.0 - 1.2 mg/dL    Alkaline Phosphatase 92 40 - 129 U/L     (H) 0 - 40 U/L    AST 55 (H) 0 - 39 U/L   CBC Auto Differential   Result Value Ref Range    WBC 7.9 4.5 - 11.5 E9/L    RBC 5.22 3.80 - 5.80 E12/L    Hemoglobin 14.2 12.5 - 16.5 g/dL    Hematocrit 43.5 37.0 - 54.0 %    MCV 83.3 80.0 - 99.9 fL    MCH 27.2 26.0 - 35.0 pg    MCHC 32.6 32.0 - 34.5 %    RDW 12.6 11.5 - 15.0 fL    Platelets 699 956 - 714 E9/L    MPV 9.6 7.0 - 12.0 fL    Neutrophils % 57.7 43.0 - 80.0 %    Immature Granulocytes % 0.4 0.0 - 5.0 %    Lymphocytes % 30.6 20.0 - 42.0 %    Monocytes % 7.3 2.0 - 12.0 %    Eosinophils % 3.5 0.0 - 6.0 %    Basophils % 0.5 0.0 - 2.0 %    Neutrophils Absolute 4.57 1.80 - 7.30 E9/L    Immature Granulocytes # 0.03 E9/L    Lymphocytes Absolute 2.43 1.50 - 4.00 E9/L    Monocytes Absolute 0.58 0.10 - 0.95 E9/L    Eosinophils Absolute 0.28 0.05 - 0.50 E9/L    Basophils Absolute 0.04 0.00 - 0.20 E9/L   Urine Drug Screen   Result Value Ref Range    Amphetamine Screen, Urine NOT DETECTED Negative <1000 ng/mL    Barbiturate Screen, Ur NOT DETECTED Negative < 200 ng/mL    Benzodiazepine Screen, Urine NOT DETECTED Negative < 200 ng/mL    Cannabinoid Scrn, Ur NOT DETECTED Negative < 50ng/mL    Cocaine Metabolite Screen, Urine NOT DETECTED Negative < 300 ng/mL    Opiate Scrn, Ur NOT DETECTED Negative < 300ng/mL    PCP Screen, Urine NOT DETECTED Negative < 25 ng/mL    Methadone Screen, Urine NOT DETECTED Negative <300 ng/mL    Oxycodone Urine NOT DETECTED Negative <100 ng/mL    FENTANYL SCREEN, URINE NOT DETECTED Negative <1 ng/mL    Drug Screen Comment: see below    Serum Drug Screen Result Value Ref Range    Ethanol Lvl <10 mg/dL    Acetaminophen Level <5.0 (L) 10.0 - 59.3 mcg/mL    Salicylate, Serum <7.4 0.0 - 30.0 mg/dL    TCA Scrn NEGATIVE Cutoff:300 ng/mL       RADIOLOGY:  Interpreted by Radiologist.  No orders to display       ------------------------- NURSING NOTES AND VITALS REVIEWED ---------------------------   The nursing notes within the ED encounter and vital signs as below have been reviewed. /71   Pulse 105   Temp 98 °F (36.7 °C)   Resp 17   Ht 5' 8\" (1.727 m)   Wt 224 lb (101.6 kg)   SpO2 98%   BMI 34.06 kg/m²   Oxygen Saturation Interpretation: Normal      ---------------------------------------------------PHYSICAL EXAM--------------------------------------      Constitutional/General: Alert and oriented x3, well appearing, non toxic in NAD  Head: Normocephalic and atraumatic  Eyes: PERRL, EOMI  Mouth: Oropharynx clear, handling secretions, no trismus  Neck: Supple, full ROM,   Pulmonary: Lungs clear to auscultation bilaterally, no wheezes, rales, or rhonchi. Not in respiratory distress  Cardiovascular:  Regular rate and rhythm, no murmurs, gallops, or rubs. 2+ distal pulses  Abdomen: Soft, non tender, non distended,   Extremities: Moves all extremities x 4. Warm and well perfused  Skin: warm and dry without rash  Neurologic: GCS 15,  Psych: Normal Affect      ------------------------------ ED COURSE/MEDICAL DECISION MAKING----------------------  Medications - No data to display    EKG: Sinus rhythm, rate 81, no STEMI, normal QT and QTc    ED COURSE:       Medical Decision Making:    Medically clear     Counseling: The emergency provider has spoken with the patient and discussed todays results, in addition to providing specific details for the plan of care and counseling regarding the diagnosis and prognosis.   Questions are answered at this time and they are agreeable with the plan.      --------------------------------- IMPRESSION AND DISPOSITION ---------------------------------    IMPRESSION  1. Suicidal ideation        DISPOSITION  Disposition: Admit to mental health unit - medically cleared for admission  Patient condition is stable      NOTE: This report was transcribed using voice recognition software.  Every effort was made to ensure accuracy; however, inadvertent computerized transcription errors may be present       Nadeen Iyer MD  02/03/22 9945

## 2022-09-30 ENCOUNTER — HOSPITAL ENCOUNTER (EMERGENCY)
Age: 22
Discharge: ANOTHER HOSPITAL | End: 2022-10-01
Attending: EMERGENCY MEDICINE
Payer: MEDICAID

## 2022-09-30 DIAGNOSIS — F32.A DEPRESSION WITH SUICIDAL IDEATION: Primary | ICD-10-CM

## 2022-09-30 DIAGNOSIS — R45.851 DEPRESSION WITH SUICIDAL IDEATION: Primary | ICD-10-CM

## 2022-09-30 DIAGNOSIS — S51.819A SUPERFICIAL LACERATION OF FOREARM: ICD-10-CM

## 2022-09-30 LAB
ALBUMIN SERPL-MCNC: 4.8 G/DL (ref 3.5–5.2)
ALP BLD-CCNC: 119 U/L (ref 40–129)
ALT SERPL-CCNC: 79 U/L (ref 0–40)
AMORPHOUS: NORMAL
AMPHETAMINE SCREEN, URINE: NOT DETECTED
ANION GAP SERPL CALCULATED.3IONS-SCNC: 11 MMOL/L (ref 7–16)
AST SERPL-CCNC: 40 U/L (ref 0–39)
BACTERIA: NORMAL /HPF
BARBITURATE SCREEN URINE: NOT DETECTED
BASOPHILS ABSOLUTE: 0.04 E9/L (ref 0–0.2)
BASOPHILS RELATIVE PERCENT: 0.4 % (ref 0–2)
BENZODIAZEPINE SCREEN, URINE: NOT DETECTED
BILIRUB SERPL-MCNC: <0.2 MG/DL (ref 0–1.2)
BILIRUBIN URINE: NEGATIVE
BLOOD, URINE: NEGATIVE
BUN BLDV-MCNC: 9 MG/DL (ref 6–20)
CALCIUM SERPL-MCNC: 10.6 MG/DL (ref 8.6–10.2)
CANNABINOID SCREEN URINE: POSITIVE
CHLORIDE BLD-SCNC: 109 MMOL/L (ref 98–107)
CLARITY: CLEAR
CO2: 27 MMOL/L (ref 22–29)
COCAINE METABOLITE SCREEN URINE: NOT DETECTED
COLOR: YELLOW
CREAT SERPL-MCNC: 0.9 MG/DL (ref 0.7–1.2)
EOSINOPHILS ABSOLUTE: 0.28 E9/L (ref 0.05–0.5)
EOSINOPHILS RELATIVE PERCENT: 2.9 % (ref 0–6)
FENTANYL SCREEN, URINE: NOT DETECTED
GFR AFRICAN AMERICAN: >60
GFR NON-AFRICAN AMERICAN: >60 ML/MIN/1.73
GLUCOSE BLD-MCNC: 99 MG/DL (ref 74–99)
GLUCOSE URINE: NEGATIVE MG/DL
HCT VFR BLD CALC: 44.5 % (ref 37–54)
HEMOGLOBIN: 14.2 G/DL (ref 12.5–16.5)
IMMATURE GRANULOCYTES #: 0.05 E9/L
IMMATURE GRANULOCYTES %: 0.5 % (ref 0–5)
KETONES, URINE: NEGATIVE MG/DL
LEUKOCYTE ESTERASE, URINE: NEGATIVE
LYMPHOCYTES ABSOLUTE: 2.94 E9/L (ref 1.5–4)
LYMPHOCYTES RELATIVE PERCENT: 30.4 % (ref 20–42)
Lab: ABNORMAL
MCH RBC QN AUTO: 27.2 PG (ref 26–35)
MCHC RBC AUTO-ENTMCNC: 31.9 % (ref 32–34.5)
MCV RBC AUTO: 85.1 FL (ref 80–99.9)
METHADONE SCREEN, URINE: NOT DETECTED
MONOCYTES ABSOLUTE: 0.97 E9/L (ref 0.1–0.95)
MONOCYTES RELATIVE PERCENT: 10 % (ref 2–12)
NEUTROPHILS ABSOLUTE: 5.38 E9/L (ref 1.8–7.3)
NEUTROPHILS RELATIVE PERCENT: 55.8 % (ref 43–80)
NITRITE, URINE: NEGATIVE
OPIATE SCREEN URINE: NOT DETECTED
OXYCODONE URINE: NOT DETECTED
PDW BLD-RTO: 12.9 FL (ref 11.5–15)
PH UA: 6.5 (ref 5–9)
PHENCYCLIDINE SCREEN URINE: NOT DETECTED
PLATELET # BLD: 263 E9/L (ref 130–450)
PMV BLD AUTO: 9.8 FL (ref 7–12)
POTASSIUM REFLEX MAGNESIUM: 4.2 MMOL/L (ref 3.5–5)
PROTEIN UA: NEGATIVE MG/DL
RBC # BLD: 5.23 E12/L (ref 3.8–5.8)
RBC UA: NORMAL /HPF (ref 0–2)
SARS-COV-2, NAAT: NOT DETECTED
SODIUM BLD-SCNC: 147 MMOL/L (ref 132–146)
SPECIFIC GRAVITY UA: 1.02 (ref 1–1.03)
TOTAL PROTEIN: 7.6 G/DL (ref 6.4–8.3)
UROBILINOGEN, URINE: 0.2 E.U./DL
WBC # BLD: 9.7 E9/L (ref 4.5–11.5)
WBC UA: NORMAL /HPF (ref 0–5)

## 2022-09-30 PROCEDURE — 82077 ASSAY SPEC XCP UR&BREATH IA: CPT

## 2022-09-30 PROCEDURE — 81001 URINALYSIS AUTO W/SCOPE: CPT

## 2022-09-30 PROCEDURE — 85025 COMPLETE CBC W/AUTO DIFF WBC: CPT

## 2022-09-30 PROCEDURE — 87635 SARS-COV-2 COVID-19 AMP PRB: CPT

## 2022-09-30 PROCEDURE — 80179 DRUG ASSAY SALICYLATE: CPT

## 2022-09-30 PROCEDURE — 80143 DRUG ASSAY ACETAMINOPHEN: CPT

## 2022-09-30 PROCEDURE — 80053 COMPREHEN METABOLIC PANEL: CPT

## 2022-09-30 PROCEDURE — 99285 EMERGENCY DEPT VISIT HI MDM: CPT | Performed by: EMERGENCY MEDICINE

## 2022-09-30 PROCEDURE — 80307 DRUG TEST PRSMV CHEM ANLYZR: CPT

## 2022-09-30 ASSESSMENT — ENCOUNTER SYMPTOMS
WHEEZING: 0
EYE PAIN: 0
VOMITING: 0
SHORTNESS OF BREATH: 0
SINUS PRESSURE: 0
NAUSEA: 0
BACK PAIN: 0
COUGH: 0
DIARRHEA: 0
SORE THROAT: 0
ABDOMINAL PAIN: 0
EYE DISCHARGE: 0
EYE REDNESS: 0

## 2022-09-30 ASSESSMENT — PAIN - FUNCTIONAL ASSESSMENT: PAIN_FUNCTIONAL_ASSESSMENT: NONE - DENIES PAIN

## 2022-09-30 ASSESSMENT — LIFESTYLE VARIABLES: HOW OFTEN DO YOU HAVE A DRINK CONTAINING ALCOHOL: NEVER

## 2022-10-01 ENCOUNTER — HOSPITAL ENCOUNTER (INPATIENT)
Age: 22
LOS: 4 days | Discharge: HOME | DRG: 753 | End: 2022-10-05
Attending: PSYCHIATRY & NEUROLOGY | Admitting: PSYCHIATRY & NEUROLOGY
Payer: MEDICAID

## 2022-10-01 VITALS
OXYGEN SATURATION: 97 % | HEART RATE: 61 BPM | RESPIRATION RATE: 18 BRPM | SYSTOLIC BLOOD PRESSURE: 132 MMHG | DIASTOLIC BLOOD PRESSURE: 76 MMHG | TEMPERATURE: 98.2 F

## 2022-10-01 PROBLEM — F32.A DEPRESSION: Status: ACTIVE | Noted: 2022-10-01

## 2022-10-01 LAB
ACETAMINOPHEN LEVEL: <5 MCG/ML (ref 10–30)
EKG ATRIAL RATE: 64 BPM
EKG P AXIS: 49 DEGREES
EKG P-R INTERVAL: 126 MS
EKG Q-T INTERVAL: 382 MS
EKG QRS DURATION: 84 MS
EKG QTC CALCULATION (BAZETT): 394 MS
EKG R AXIS: 83 DEGREES
EKG T AXIS: 32 DEGREES
EKG VENTRICULAR RATE: 64 BPM
ETHANOL: <10 MG/DL (ref 0–0.08)
SALICYLATE, SERUM: <0.3 MG/DL (ref 0–30)
TRICYCLIC ANTIDEPRESSANTS SCREEN SERUM: NEGATIVE NG/ML

## 2022-10-01 PROCEDURE — 1240000000 HC EMOTIONAL WELLNESS R&B

## 2022-10-01 PROCEDURE — 6370000000 HC RX 637 (ALT 250 FOR IP): Performed by: PSYCHIATRY & NEUROLOGY

## 2022-10-01 PROCEDURE — 93005 ELECTROCARDIOGRAM TRACING: CPT | Performed by: EMERGENCY MEDICINE

## 2022-10-01 PROCEDURE — 6370000000 HC RX 637 (ALT 250 FOR IP): Performed by: NURSE PRACTITIONER

## 2022-10-01 RX ORDER — LANOLIN ALCOHOL/MO/W.PET/CERES
3 CREAM (GRAM) TOPICAL NIGHTLY
Status: DISCONTINUED | OUTPATIENT
Start: 2022-10-01 | End: 2022-10-05 | Stop reason: HOSPADM

## 2022-10-01 RX ORDER — DIVALPROEX SODIUM 500 MG/1
500 TABLET, DELAYED RELEASE ORAL EVERY 12 HOURS SCHEDULED
Status: DISCONTINUED | OUTPATIENT
Start: 2022-10-01 | End: 2022-10-05 | Stop reason: HOSPADM

## 2022-10-01 RX ORDER — HALOPERIDOL 5 MG/ML
5 INJECTION INTRAMUSCULAR EVERY 6 HOURS PRN
Status: DISCONTINUED | OUTPATIENT
Start: 2022-10-01 | End: 2022-10-05 | Stop reason: HOSPADM

## 2022-10-01 RX ORDER — HALOPERIDOL 5 MG
5 TABLET ORAL EVERY 6 HOURS PRN
Status: DISCONTINUED | OUTPATIENT
Start: 2022-10-01 | End: 2022-10-05 | Stop reason: HOSPADM

## 2022-10-01 RX ORDER — MAGNESIUM HYDROXIDE/ALUMINUM HYDROXICE/SIMETHICONE 120; 1200; 1200 MG/30ML; MG/30ML; MG/30ML
30 SUSPENSION ORAL PRN
Status: DISCONTINUED | OUTPATIENT
Start: 2022-10-01 | End: 2022-10-05 | Stop reason: HOSPADM

## 2022-10-01 RX ORDER — HYDROXYZINE PAMOATE 50 MG/1
50 CAPSULE ORAL 3 TIMES DAILY PRN
Status: DISCONTINUED | OUTPATIENT
Start: 2022-10-01 | End: 2022-10-05 | Stop reason: HOSPADM

## 2022-10-01 RX ORDER — NICOTINE 21 MG/24HR
1 PATCH, TRANSDERMAL 24 HOURS TRANSDERMAL DAILY
Status: DISCONTINUED | OUTPATIENT
Start: 2022-10-01 | End: 2022-10-01

## 2022-10-01 RX ADMIN — DIVALPROEX SODIUM 500 MG: 500 TABLET, DELAYED RELEASE ORAL at 21:12

## 2022-10-01 RX ADMIN — MELATONIN 3 MG ORAL TABLET 3 MG: 3 TABLET ORAL at 21:12

## 2022-10-01 ASSESSMENT — LIFESTYLE VARIABLES
HOW OFTEN DO YOU HAVE A DRINK CONTAINING ALCOHOL: NEVER
HOW MANY STANDARD DRINKS CONTAINING ALCOHOL DO YOU HAVE ON A TYPICAL DAY: PATIENT DOES NOT DRINK

## 2022-10-01 ASSESSMENT — PATIENT HEALTH QUESTIONNAIRE - PHQ9
SUM OF ALL RESPONSES TO PHQ QUESTIONS 1-9: 10
SUM OF ALL RESPONSES TO PHQ QUESTIONS 1-9: 10

## 2022-10-01 ASSESSMENT — SLEEP AND FATIGUE QUESTIONNAIRES
DO YOU HAVE DIFFICULTY SLEEPING: NO
DO YOU USE A SLEEP AID: NO
DO YOU HAVE DIFFICULTY SLEEPING: NO
AVERAGE NUMBER OF SLEEP HOURS: 8
DO YOU USE A SLEEP AID: NO
AVERAGE NUMBER OF SLEEP HOURS: 5

## 2022-10-01 NOTE — ED NOTES
Patient accepted to [facility] by Dr. Juan Harry, diagnosis major depressive disorder.  César Land notified.       Zeyad Nickerson RN  10/01/22 0418

## 2022-10-01 NOTE — ED NOTES
Pt in bed, eyes closed, lights turned down, CO continued at bedside      DEANNE Prather, [facility]  10/01/22 4068

## 2022-10-01 NOTE — ED NOTES
Pt in bed, eyes closed, lights turned down, CO continued at bedside      [address] [facility] St[place], TONO  10/01/22 0058

## 2022-10-01 NOTE — ED NOTES
Pt has been accepted to [address] by [address]. Juan Harry, ABIEL Landers in admitting was called with disposition, Room [address]A    [address] [place] [address] at [address] [facility],[address] was notified     N2N: [address], [place]  10/01/22 4740

## 2022-10-01 NOTE — CARE COORDINATION
Biopsychosocial Assessment Note    Social work met with patient to complete the biopsychosocial assessment and C-SSRS. Chief Complaint: pt reports \"I tried to kill myself last night\"    Mental Status Exam: pt alert&oriented x4. Pt cooperative. Pt mood depressed, anxious, flat affect. Pt eye contact fair, speech clear. Pt thoughts disorganized, loose association, circumstantial. Pt insight/judgement poor. Pt reports SI with no plan, denied HI/AVH. Clinical Summary: pt reports he was planning to slit his throat prior to coming to the hospital. Pt reports he did cut his wrist but he believes this was just self injurious behaviors. Pt reports he stopped taking his medications within the last week after his grandfather . Pt reports his parents stopped talking to him right after the  and he doesn't know why. Pt reports a hx of psych admissions at this facility and at [facility]. Pt reports he thinks his last admission was a month ago. Pt reports this is his first suicide attempt. However per chart pt reports he tried to attempt suicide in 2019 per chart. Pt reports this is his first time engaging in self injurious behaviors. Pt reports he is unable to say how often he has SI. Pt reports he has them when \"people dont give a fuck about me. \" Pt reports the people he is referring to are people he now hates, pt would not elaborate further. Pt denied alcohol use and reports smoking marijuana a few times a month with a friend. Pt denied trauma/abuse hx. Pt reports he graduated from high school, is unemployed, and receives food stamps. Pt reports having several siblings, when asked to discuss this further pt reported \"they are all autistic\" and then proceeded to say he has so many that \"it makes me want to shoot one. \" Pt reports he thinks his mom overdosed, unable to elaborate further. Pt reports normal appetite and normal sleep.  Pt reports feeling hopeless/helpless with a loss of interest/pleasure in doing things nearly everyday for the past two weeks. Risk Factors: mental health diagnosis, substance use, recent loss, limited family/friend support, hx of inpatient psychiatric admissions, recently stopped taking his medications, previous suicide attempt    Protective Factors: help-seeking behavior, connected with an outpatient mental health provider, safe/stable housing    Gender  [x] Male [] Female [] Transgender  [] Other    Sexual Orientation    [x] Heterosexual [] Homosexual [] Bisexual [] Other    Suicidal Ideation  [x] Past [] Present [] Denies     C-SSRS Screening Completed: Current Suicide Risk:  [] No Risk  [x] Low [] Moderate [] High    Homicidal Ideation  [] Past [] Present [x] Denies     Hallucinations/Delusions (Specify type)  [] Reports [x] Denies     Current or Past Mental Health Treatment:  [x] Yes, When and Where: hx of admissions  [] No    Substance Use/Alcohol Use/Addiction  [x] Reports [] Denies     Tobacco Use (within the last 6 months)  [x] Reports [] Denies     Trauma History  [] Reports [x] Denies     Self Injurious/Self Mutilation Behaviors:   [x] Reports:    [] Past [x] Present   [] Denies    Legal History:  []  Yes (Specify)    [x] No    Collateral Contact (AUSTEN signed) pt denied  Name:   Relationship:  Number:     Collateral Information:      Access to Weapons per Collateral Contact: [] Reports [] Denies     After consideration of C-SSRS screening results, C-SSRS assessments, and this professional's assessment the patient's overall suicide risk assessed to be:  [] None   [x] Low   [] Moderate   [] High     [x] Discussed current suicide risk, protective and risk factors with RN and NP/Psychiatrist.    Discharge Plan:  [x] Home: independent housing through [facility]; reports he does not want to go to the crisis unit again before going home  [] Shelter:  [] Crisis Unit:  [] Substance Abuse Rehab:  [] Nursing Facility:  [] Other (Specify):     Follow up Provider: pt reports he is active with a provider in [place] but he does not know the atul

## 2022-10-01 NOTE — ED NOTES
requested this RN present patient to psychiatrist for admission. Reviewed chart and noted no EKG was completed. Unable to present without EKG,  Krystyna Garcia notified.       Veronica Lim RN  10/01/22 6893

## 2022-10-01 NOTE — ED NOTES
Patient remains in bed resting at this time. CO remains at the bedside.       Yeny Jordan RN  10/01/22 3730

## 2022-10-01 NOTE — ED NOTES
Nurse to nurse given to [facility], Pending sale to [facility]. All questions answered at this time.      Xiao Pisano RN  10/01/22 4573

## 2022-10-01 NOTE — ED NOTES
Behavioral Health Crisis Assessment    [facility] AT [address]    Chief Complaint: \"My depression got really bad and I tried killing myself. \"    Mental Status Exam: Pt is alert and oriented x3, calm and cooperative, eye contact is appropriate, flat affect, mood is depressed, speech is normal, behavior withdrawn, endorsed suicidal ideation, denies homicidal ideation, denies auditory and visual hallucinations, insight and judgement is poor. Legal Status  [] Voluntary:  [x] Involuntary, Issued by: ED DOCTOR    Gender  [x] Male [] Female [] Transgender  [] Other    Sexual Orientation    [x] Heterosexual [] Homosexual [] Bisexual [] Other    Brief Clinical Summary: Pt is a 25year old male presenting to the ED with depression and suicidal ideation with a plan to cut himself in attempt to kill himself. Pt cut his forearm with a knife and then called 911. Pt reports his grandfather  recently and his mom or dad will not talk to him anymore so he was unable to attend the . This made him very upset and depressed. Pt states he lives in an independent housing complex ran through the [employer]. Pt reports he does not have a counselor or psychiatrist in the community. Pt reports he is on psych medications but when asked who prescribes them, pt replies he does not have a doctor. SW asked if a doctor or counselor comes to his house and sees him, pt states \"yes, but I don't know who he is. \" Pt is unable to verbalize what MH medications he is currently taking. Pt denies auditory and visual hallucinations. Pt has no history of drug or alcohol use. Pt reports he wishes he were dead right now. Pt has previous suicide attempt in 2019 when he tried to kill himself with a knife again. Pt needs inpatient psych to ensure his safety and stabilization.      Collateral Information: none    Risk Factors:  Autism spectrum disorder, Borderline intellectual functioning, Severe episode of recurrent major depressive disorder, without psychotic features  Lack of self care  Lack of essential needs  Poor communication skills  Previous suicide attempt    Protective Factors:  Help seeking behavior  Safe and stable housing  No access to weapons    Suicidal Ideations:   [x] Reports:    [x] Past [x] Present   [] Denies    Suicide Attempts:  [x] Reports:   [] Denies    C-SSRS Screening Completed by RN: Current Suicide Risk:  [] No Risk [] Low [] Moderate [x] High    Homicidal Ideations  [] Reports:   [] Past [] Present   [x] Denies     Self Injurious/Self Mutilation Behaviors:   [x] Reports:    [] Past [x] Present   [] Denies    Hallucinations/Delusions   [] Reports:   [x] Denies     Substance Use/Alcohol Use/Addiction:   [] Reports:   [x] Denies   [x] SBIRT Screen Complete. Current or Past Substance Abuse Treatment  [] Yes, When and Where:  [x] No    Current or Past Mental Health Treatment:  [] Yes, When and Where:  [x] No pt unsure who prescribes his MH medication and unsure what MH medications he is taking. Legal Issues:  []  Yes (Specify)  [x]  No    Access to Weapons:  []  Yes (Specify)  [x]  No    Trauma History  [] Reports:  [x] Denies     Living Situation: Lives in [place]    Employment: no income    Education Level: graduated high school    Violence Risk Screening:        Have you ever thought about hurting someone? [x]  No  []  Yes (Ask the questions listed below)   When? Did you follow through with the thoughts? [x] No     [] Yes- When and what happened? 2.  Have you ever threatened anyone? [x]  No  []  Yes (Ask the questions listed below)   When and what happened? Have you ever threatened someone with a gun, knife or other weapon? [x]  No  []  Yes - When and what happened? 2. Have you ever had an order of protection taken out against you? []  Yes [x]  No  3. Have you ever been arrested due to violence? []  Yes [x]  No  4. Have you ever been cruel to animals?  []  Yes [x]  No    After consideration of C-SSRS screening results, C-SSRS assessments, and this professional's assessment the patient's overall suicide risk assessed to be:  [] No Risk  [] Low   [] Moderate   [x] High     [x] Discussed current suicide risk, protective and risk factors with RN and ED Physician     Disposition   [] Home:   [] Outpatient Provider:   [] Crisis Unit:   [x] Inpatient Psychiatric Unit:  [] Other:                    [place], [place]  10/01/22 1095 [address]  10/01/22 0160

## 2022-10-01 NOTE — ED NOTES
RAYMON BURDICK called [address] [facility],[address] and spoke to pt's nurse, [address] [facility], and requested an EKG to be ordered, completed, and entered into Epic.        Qi Burnett  10/01/22 8915

## 2022-10-01 NOTE — ED PROVIDER NOTES
Patient is a 26 y/o male who presents to the ED via EMS with suicidal ideations. Patient states that his grandfather  recently. He states that neither his mother nor his father will talk to him and he was unable to attend the . He states that this has made him very depressed. Tonight, he reports cutting his right forearm with intent to kill himself. He then called 911 and reported his suicide attempt. Review of Systems   Constitutional:  Negative for chills and fever. HENT:  Negative for ear pain, sinus pressure and sore throat. Eyes:  Negative for pain, discharge and redness. Respiratory:  Negative for cough, shortness of breath and wheezing. Cardiovascular:  Negative for chest pain. Gastrointestinal:  Negative for abdominal pain, diarrhea, nausea and vomiting. Genitourinary:  Negative for dysuria and frequency. Musculoskeletal:  Negative for arthralgias and back pain. Skin:  Positive for wound. Negative for rash. Neurological:  Negative for weakness and headaches. Hematological:  Negative for adenopathy. Psychiatric/Behavioral:  Positive for suicidal ideas. All other systems reviewed and are negative. Physical Exam  Vitals and nursing note reviewed. Constitutional:       General: He is not in acute distress. HENT:      Head: Normocephalic and atraumatic. Right Ear: External ear normal.      Left Ear: External ear normal.      Nose: Nose normal.      Mouth/Throat:      Mouth: Mucous membranes are moist.   Eyes:      Conjunctiva/sclera: Conjunctivae normal.      Pupils: Pupils are equal, round, and reactive to light. Cardiovascular:      Rate and Rhythm: Normal rate and regular rhythm. Heart sounds: No murmur heard. Pulmonary:      Effort: Pulmonary effort is normal. No respiratory distress. Breath sounds: Normal breath sounds. No stridor. No wheezing, rhonchi or rales. Abdominal:      General: Bowel sounds are normal. There is no distension. Palpations: Abdomen is soft. Tenderness: There is no abdominal tenderness. There is no guarding. Musculoskeletal:         General: Normal range of motion. Cervical back: Normal range of motion and neck supple. Comments: Linear superficial lacerations of right forearm. No active bleeding. Skin:     General: Skin is warm and dry. Neurological:      Mental Status: He is alert and oriented to person, place, and time. Psychiatric:         Attention and Perception: Attention and perception normal.         Mood and Affect: Mood is depressed. Affect is flat. Speech: Speech normal.         Behavior: Behavior is withdrawn. Thought Content: Thought content includes suicidal ideation. Thought content includes suicidal plan. Procedures     [facility]                         --------------------------------------------- PAST HISTORY ---------------------------------------------  Past Medical History:  has a past medical history of Autism, History of suicidal tendencies, OCD (obsessive compulsive disorder), and Seizures ([facility] [address] Past Surgical History:  has no past surgical history on file. Social History:  reports that he has been smoking cigarettes. He has been smoking an average of 1 pack per day. He has never used smokeless tobacco. He reports that he does not currently use alcohol. He reports that he does not use drugs. Family History: family history is not on file. The patients home medications have been reviewed. Allergies: Patient has no known allergies.     -------------------------------------------------- RESULTS -------------------------------------------------    LABS:  Results for orders placed or performed during the hospital encounter of 09/30/22   COVID-19, Rapid    Specimen: Nasopharyngeal Swab   Result Value Ref Range    SARS-CoV-2, NAAT Not Detected Not Detected   CBC with Auto Differential   Result Value Ref Range    WBC 9.7 4.5 - 11.5 E9/L    RBC 5.23 3.80 - 5.80 E12/L    Hemoglobin 14.2 12.5 - 16.5 g/dL    Hematocrit 44.5 37.0 - 54.0 %    MCV 85.1 80.0 - 99.9 fL    MCH 27.2 26.0 - 35.0 pg    MCHC 31.9 (L) 32.0 - 34.5 %    RDW 12.9 11.5 - 15.0 fL    Platelets 355 457 - 060 E9/L    MPV 9.8 7.0 - 12.0 fL    Neutrophils % 55.8 43.0 - 80.0 %    Immature Granulocytes % 0.5 0.0 - 5.0 %    Lymphocytes % 30.4 20.0 - 42.0 %    Monocytes % 10.0 2.0 - 12.0 %    Eosinophils % 2.9 0.0 - 6.0 %    Basophils % 0.4 0.0 - 2.0 %    Neutrophils Absolute 5.38 1.80 - 7.30 E9/L    Immature Granulocytes # 0.05 E9/L    Lymphocytes Absolute 2.94 1.50 - 4.00 E9/L    Monocytes Absolute 0.97 (H) 0.10 - 0.95 E9/L    Eosinophils Absolute 0.28 0.05 - 0.50 E9/L    Basophils Absolute 0.04 0.00 - 0.20 E9/L   Comprehensive Metabolic Panel w/ Reflex to MG   Result Value Ref Range    Sodium 147 (H) 132 - 146 mmol/L    Potassium reflex Magnesium 4.2 3.5 - 5.0 mmol/L    Chloride 109 (H) 98 - 107 mmol/L    CO2 27 22 - 29 mmol/L    Anion Gap 11 7 - 16 mmol/L    Glucose 99 74 - 99 mg/dL    BUN 9 6 - 20 mg/dL    Creatinine 0.9 0.7 - 1.2 mg/dL    GFR Non-African American >60 >=60 mL/min/1.73    GFR African American >60     Calcium 10.6 (H) 8.6 - 10.2 mg/dL    Total Protein 7.6 6.4 - 8.3 g/dL    Albumin 4.8 3.5 - 5.2 g/dL    Total Bilirubin <0.2 0.0 - 1.2 mg/dL    Alkaline Phosphatase 119 40 - 129 U/L    ALT 79 (H) 0 - 40 U/L    AST 40 (H) 0 - 39 U/L   Urinalysis with Microscopic   Result Value Ref Range    Color, UA Yellow Straw/Yellow    Clarity, UA Clear Clear    Glucose, Ur Negative Negative mg/dL    Bilirubin Urine Negative Negative    Ketones, Urine Negative Negative mg/dL    Specific Gravity, UA 1.020 1.005 - 1.030    Blood, Urine Negative Negative    pH, UA 6.5 5.0 - 9.0    Protein, UA Negative Negative mg/dL    Urobilinogen, Urine 0.2 <2.0 E.U./dL    Nitrite, Urine Negative Negative    Leukocyte Esterase, Urine Negative Negative    WBC, UA 0-1 0 - 5 /HPF    RBC, UA NONE 0 - 2 /HPF    Bacteria, UA NONE SEEN None Seen /HPF    Amorphous, UA FEW    Serum Drug Screen   Result Value Ref Range    Ethanol Lvl <10 mg/dL    Acetaminophen Level <5.0 (L) 10.0 - 89.9 mcg/mL    Salicylate, Serum <0.5 0.0 - 30.0 mg/dL   URINE DRUG SCREEN   Result Value Ref Range    Amphetamine Screen, Urine NOT DETECTED Negative <1000 ng/mL    Barbiturate Screen, Ur NOT DETECTED Negative < 200 ng/mL    Benzodiazepine Screen, Urine NOT DETECTED Negative < 200 ng/mL    Cannabinoid Scrn, Ur POSITIVE (A) Negative < 50ng/mL    Cocaine Metabolite Screen, Urine NOT DETECTED Negative < 300 ng/mL    Opiate Scrn, Ur NOT DETECTED Negative < 300ng/mL    PCP Screen, Urine NOT DETECTED Negative < 25 ng/mL    Methadone Screen, Urine NOT DETECTED Negative <300 ng/mL    Oxycodone Urine NOT DETECTED Negative <100 ng/mL    FENTANYL SCREEN, URINE NOT DETECTED Negative <1 ng/mL    Drug Screen Comment: see below        RADIOLOGY:  No orders to display           ------------------------- NURSING NOTES AND VITALS REVIEWED ---------------------------  Date / Time Roomed:  9/30/2022  8:14 PM  ED Bed Assignment:  [place] [id number]    The nursing notes within the ED encounter and vital signs as below have been reviewed. Patient Vitals for the past 24 hrs:   BP Temp Temp src Pulse Resp SpO2   09/30/22 2022 -- -- -- 84 -- --   09/30/22 2018 (!) 170/91 99.1 °F (37.3 °C) Oral -- 18 100 %       Oxygen Saturation Interpretation: Normal    ------------------------------------------ PROGRESS NOTES ------------------------------------------  Re-evaluation(s):  Time: 2217. Patients symptoms show no change  Repeat physical examination is not changed    Counseling:  I have spoken with the patient and discussed todays results, in addition to providing specific details for the plan of care and counseling regarding the diagnosis and prognosis.   Their questions are answered at this time and they are agreeable with the plan of admission.    --------------------------------- ADDITIONAL PROVIDER NOTES ---------------------------------  Consultations: This patient's ED course included: a personal history and physicial examination and re-evaluation prior to disposition    This patient has remained hemodynamically stable during their ED course. Diagnosis:  1. Depression with suicidal ideation    2. Superficial laceration of forearm        Disposition:  Patient's disposition: Admit to mental health unit - medically cleared for admission  Patient's condition is stable.          [address] [facility], DO  09/30/22 22[facility],   11/09/22 [id number]

## 2022-10-01 NOTE — ED NOTES
[facility] - [facility] notified // [facility] slip & telehealth consent faxed     Elizabeth Webber  09/30/22 [id number]

## 2022-10-01 NOTE — PROGRESS NOTES
[facility]  Admission Note            25 yr old male admitted to unit on an involuntary from [facility] for [place] [facility]  pt reports his grandfather passed away   denies current SI/HI   mood is anxious  pt admits to non compliance with meds   admits he used to follow with [facility] but was dismissed for missing his appointments   denies hallucinations however appears distracted  Admission Type:   Admission Type: Involuntary    Reason for admission:  Reason for Admission: \"I'm suicidal\"      Addictive Behavior:   Addictive Behavior  In the Past 3 Months, Have You Felt or Has Someone Told You That You Have a Problem With  : None    Medical Problems:   Past Medical History:   Diagnosis Date    Autism     History of suicidal tendencies     OCD (obsessive compulsive disorder)     Seizures (HCC)        Status EXAM:  Mental Status and Behavioral Exam  Normal: No  Level of Assistance: Independent/Self  Facial Expression: Avoids Gaze  Affect: Blunt  Level of Consciousness: Alert  Frequency of Checks: 4 times per hour, close  Mood:Normal: No  Mood: Anxious  Motor Activity:Normal: No  Motor Activity: Decreased  Eye Contact: Fair  Observed Behavior: Cooperative, Preoccupied  Sexual Misconduct History: Current - no  Preception: [place] to person, [place] to place, [place] to situation, [place] to time  Attention:Normal: No  Attention: Distractible  Thought Processes: Circumstantial, Flight of ideas, Loose association  Thought Content:Normal: No  Thought Content: Preoccupations  Depression Symptoms: Feelings of hopelessess, Feelings of helplessness, Loss of interest  Anxiety Symptoms: Generalized  Genny Symptoms: No problems reported or observed.   Hallucinations: None  Delusions: No  Memory:Normal: Yes  Insight and Judgment: No  Insight and Judgment: Poor insight, Poor judgment    Tobacco Screening:  Practical Counseling, on admission, desiree X, if applicable and completed (first 3 are required if patient doesn't refuse): ( x) Recognizing danger situations (included triggers and roadblocks)                    (x ) Coping skills (new ways to manage stress,relaxation techniques, changing routine, distraction)                                                           ( x) Basic information about quitting (benefits of quitting, techniques in how to quit, available resources  ( x) Referral for counseling faxed to [facility]                                                                                                                   ( ) Patient refused counseling  ( ) Patient has not smoked in the last 30 days    Metabolic Screening:    Lab Results   Component Value Date    LABA1C 5.4 03/25/2021       Lab Results   Component Value Date    CHOL 110 03/25/2021     Lab Results   Component Value Date    TRIG 107 03/25/2021     Lab Results   Component Value Date    HDL 34 03/25/2021     No components found for: LDLCAL  Lab Results   Component Value Date    LABVLDL 21 03/25/2021         There is no height or weight on file to calculate BMI.     BP Readings from Last 2 Encounters:   10/01/22 132/76   02/03/22 115/71           Pt admitted with followings belongings:       Lazaro Carmona RN

## 2022-10-01 NOTE — ED NOTES
Assumed care of this patient at this time. Patient resting in bed, CO at the bedside.      Kira Membreno RN  10/01/22 [id number]

## 2022-10-01 NOTE — ED NOTES
Patient resting in bed comfortably at this time. CO remains at the bedside.      Xiao Pisano RN  10/01/22 [id number]

## 2022-10-01 NOTE — ED NOTES
Patient was changed into paper gown at this time. Belonging placed in LOCKER 4. Patient had pants, shirt, keys, phone and shoes in bag.      Luis Antonio Boggs RN  10/01/22 [id number]

## 2022-10-02 PROBLEM — F32.A DEPRESSION: Status: RESOLVED | Noted: 2022-10-01 | Resolved: 2022-10-02

## 2022-10-02 PROBLEM — F31.63 SEVERE MIXED BIPOLAR DISORDER (HCC): Status: ACTIVE | Noted: 2022-10-02

## 2022-10-02 PROCEDURE — 6370000000 HC RX 637 (ALT 250 FOR IP): Performed by: NURSE PRACTITIONER

## 2022-10-02 PROCEDURE — 1240000000 HC EMOTIONAL WELLNESS R&B

## 2022-10-02 PROCEDURE — 90792 PSYCH DIAG EVAL W/MED SRVCS: CPT | Performed by: NURSE PRACTITIONER

## 2022-10-02 PROCEDURE — 6370000000 HC RX 637 (ALT 250 FOR IP): Performed by: PSYCHIATRY & NEUROLOGY

## 2022-10-02 RX ORDER — ARIPIPRAZOLE 5 MG/1
5 TABLET ORAL DAILY
Status: DISCONTINUED | OUTPATIENT
Start: 2022-10-02 | End: 2022-10-02

## 2022-10-02 RX ORDER — ARIPIPRAZOLE 5 MG/1
5 TABLET ORAL DAILY
Status: COMPLETED | OUTPATIENT
Start: 2022-10-02 | End: 2022-10-02

## 2022-10-02 RX ORDER — ARIPIPRAZOLE 10 MG/1
10 TABLET ORAL DAILY
Status: DISCONTINUED | OUTPATIENT
Start: 2022-10-03 | End: 2022-10-05

## 2022-10-02 RX ADMIN — DIVALPROEX SODIUM 500 MG: 500 TABLET, DELAYED RELEASE ORAL at 08:47

## 2022-10-02 RX ADMIN — NICOTINE POLACRILEX 4 MG: 2 GUM, CHEWING BUCCAL at 12:25

## 2022-10-02 RX ADMIN — ARIPIPRAZOLE 5 MG: 5 TABLET ORAL at 11:52

## 2022-10-02 RX ADMIN — HYDROXYZINE PAMOATE 50 MG: 50 CAPSULE ORAL at 12:11

## 2022-10-02 RX ADMIN — NICOTINE POLACRILEX 4 MG: 2 GUM, CHEWING BUCCAL at 18:27

## 2022-10-02 RX ADMIN — DIVALPROEX SODIUM 500 MG: 500 TABLET, DELAYED RELEASE ORAL at 21:31

## 2022-10-02 NOTE — H&P
Department of Psychiatry  History and Physical - Adult     CHIEF COMPLAINT:  \"I was depressed,and I had a knife to my throat\"    Patient was seen after discussing with the treatment team and reviewing the chart    CIRCUMSTANCES OF ADMISSION:   Patient presented to [facility] ED via EMS for psychiatric evaluation after he was threatening to cut his throat with a knife, the police came and de-escalated the situation and brought the patient here. He was pink slipped for psychiatric treatment. HISTORY OF PRESENT ILLNESS:      The patient is a 25 y.o. male with significant past history of autism spectrum disorder, ADHD and depression with past inpatient psychiatric hospitalizations known to these providers with last inpatient hospitalization here at [facility]) in . Patient presented to [facility] ED via EMS for psychiatric evaluation after he was threatening to cut his throat with a knife, the police came and de-escalated the situation and brought the patient here. He was pink slipped for psychiatric treatment. Patient was medically cleared in the emergency department, UDS in ED positive for cannabinoid, . He was admitted to [facility] for psychiatric evaluation and stabilization. Upon assessment today the patient is very cooperative and pleasant, he is somewhat exaggerated in his mannerisms his voice is loud his speech is somewhat pressured but he is calm and pleasant for assessment. He states that his grandfather  2 weeks ago and that no one in his family contacted him to let him know and no one at contacted him to take him to the .  He states that he is now living in [place] [address] and that he has been doing pretty good leading up to this. He has reported that his parents have not been talking to him since the  and he is not sure why his parents are not interacting with him.   He does tell me that he had stopped taking all of his medications including his Keppra and he refuses to start his Keppra back up again because he does not like how the medication makes him feel he states that he is agreeable to continue depakote and get back on his psychiatric medications. He is not endorsing any current suicidal ideation but stated that he was very depressed leading up to this hospitalization. No overt or covert sign of psychosis or paranoia. Patient definitely demonstrates a mood element more consistent with a bipolar disorder opposed to a unipolar depression. Insight judgment impulse control are marginal at baseline, he is alert oriented to person place time situation and easily able to recount the events leading to hospitalization. Psychiatric history: She has been diagnosed with ADHD, autism and has a prior psychiatric inpatient hospitalization at [facility] in February 2021. States that he was taking Wellbutrin and Abilify. And has a history of taking Vyvanse which he has not had in 3 months. Patient has been noncompliant with his treatment and stopped taking his psychotropic medications a couple weeks ago. He endorses that he cut his wrist 1 time but states this was self-injurious behavior he did not intend to kill himself. He endorses having suicidal ideations but is not able to elaborate on how often this occurs. He denies any history of suicide attempts prior to this 1. Family psychiatric history:  Patient states that all of his siblings have autism. Substance abuse history: patient endorses that he has begun smoking marijuana with his friend who has a medical marijuana card. He states that is helping with his seizures. UDS in ED is positive for THc, EtOH screen in ED is negative. Legal history: Denies     Personal family social history:  Patient states that he was raised by his parents and denies any history of trauma or abuse while growing up, he states that he graduated from high school is currently unemployed however he does receive food stamps and SSDI. He states that he has several siblings and reports \"they are all autistic\" he states that he is currently residing in the [facility] but has previously experienced homelessness. Past Medical History:        Diagnosis Date    Autism     History of suicidal tendencies     OCD (obsessive compulsive disorder)     Seizures ([facility] [address]        Medications Prior to Admission:   Medications Prior to Admission: ABILIFY MAINTENA 400 MG SRER,   divalproex (DEPAKOTE) 500 MG DR tablet,   sertraline (ZOLOFT) 50 MG tablet,   levETIRAcetam (KEPPRA) 500 MG tablet, Take 1 tablet by mouth 2 times daily  venlafaxine (EFFEXOR) 100 MG tablet, Take 100 mg by mouth 3 times daily  buPROPion (WELLBUTRIN XL) 150 MG extended release tablet, Take 1 tablet by mouth daily    Past Surgical History:    No past surgical history on file. Allergies:   Patient has no known allergies. Family History  No family history on file. EXAMINATION:    REVIEW OF SYSTEMS:    ROS:  [x] All negative/unchanged except if checked.  Explain positive(checked items) below:  [] Constitutional  [] Eyes  [] Ear/Nose/Mouth/Throat  [] Respiratory  [] CV  [] GI  []   [] Musculoskeletal  [] Skin/Breast  [] Neurological  [] Endocrine  [] Heme/Lymph  [] Allergic/Immunologic    Explanation:     Vitals:  /63   Pulse 80   Temp 98.7 °F (37.1 °C) (Oral)   Resp 18   SpO2 94%      Physical Examination:   Head: x  Atraumatic: x normocephalic  Skin and Mucosa        Moist x  Dry   Pale  x Normal   Neck:  Thyroid  Palpable   x  Not palpable   venus distention   adenopathy   Chest: x Clear   Rhonchi     Wheezing   CV:  xS1   xS2    xNo murmer   Abdomen:  x  Soft    Tender    Viceromegaly   Extremities:  x No Edema     Edema     Cranial Nerves Examination:   CN II:   xPupils are reactive to light  Pupils are non reactive to light  CN III, IV, VI:  xNo eye deviation    No diplopia or ptosis   CN V:    xFacial Sensation is intact     Facial Sensation is not intact CN IIIV:   x Hearing is normal to rubbing fingers   CN IX, X:     xNormal gag reflex and phonation   CN XI:   xShoulder shrug and neck rotation is normal  CNXII:    xTongue is midline no deviation or atrophy    Mental Status Examination:    Level of consciousness:  within normal limits   Appearance:  well-appearing  Behavior/Motor:  hyperactive  Attitude toward examiner:  cooperative and attentive  Speech:  loud and pressured   Mood: \"I am depressed\"  Affect:  mood incongruent, patient is bright, energetic   Thought processes:  goal directed   Thought content: The patient is devoid of suicidal or homicidal ideation intent or plan currently however was presented to [facility] emergency department after having a knife to his neck prior to de-escalation from the police. Devoid of auditory or visual hallucinations or other perceptual disturbances, there are no overt or covert signs of psychosis or paranoia. There are no neurovegetative signs of depression.   Cognition:  oriented to person, place, and time   Concentration distractible  Memory intact  Insight poor   Judgement poor   Fund of Knowledge adequate      DIAGNOSIS:  Severe mixed bipolar disorder  History ADHD  Autism spectrum disorder  Cannabis abuse      LABS: REVIEWED TODAY:  Recent Labs     09/30/22 2050   WBC 9.7   HGB 14.2        Recent Labs     09/30/22 2050   *   K 4.2   *   CO2 27   BUN 9   CREATININE 0.9   GLUCOSE 99     Recent Labs     09/30/22 2050   BILITOT <0.2   ALKPHOS 119   AST 40*   ALT 79*     Lab Results   Component Value Date/Time    LABAMPH NOT DETECTED 09/30/2022 08:51 PM    BARBSCNU NOT DETECTED 09/30/2022 08:51 PM    LABBENZ NOT DETECTED 09/30/2022 08:51 PM    LABMETH NOT DETECTED 09/30/2022 08:51 PM    OPIATESCREENURINE NOT DETECTED 09/30/2022 08:51 PM    PHENCYCLIDINESCREENURINE NOT DETECTED 09/30/2022 08:51 PM    ETOH <10 09/30/2022 08:50 PM     Lab Results   Component Value Date/Time    TSH 4.360 08/20/2021 12:04 AM     No results found for: LITHIUM  No results found for: VALPROATE, CBMZ  No results found for: LITHIUM, VALPROATE      Radiology No results found. TREATMENT PLAN:  The patient's diagnosis, treatment plan, medication management were formulated after patient was seen directly by the attending physician and myself and all relevant documentation was reviewed. Risk, benefit, side effects, possible outcomes of the medication and alternatives discussed with the patient and the patient demonstrated understanding. The patient was also educated that the outcome of treatment will depend on the medication compliance as directed by the prescribers along with regular follow-up, compliance with the labs and other work-up, as clinically indicated. Risk Management: Based on the diagnosis and assessment biopsychosocial treatment model was presented to the patient and was given the opportunity to ask any question. The patient was agreeable to the plan and all the patient's questions were answered to the patient's satisfaction. I discussed with the patient the risk, benefit, alternative and common side effects for the proposed medication treatment. The patient is consenting to this treatment. The patient was referred to outpatient/inpatient substance abuse rehabilitation programming. He was educated multiple times during the hospitalization that if he chooses to continue to use drugs or alcohol, he may potentially act out impulsively, resulting in serious harm to self or others, even though unintentional.  He was also educated that mental health treatment cannot be optimized with ongoing use of drugs. He demonstrated understanding has the capacity to understand that. Patient does not view his marijuana use as a  problem, rather he believes that this helps with his seizure disorder.     The patients risk factors have been mitigated as they have been admitted to inpatient behavioral health in an emotionally supportive environment with q 15 minute safety checks. Okay to discontinue the 1:1, patient is low risk for suicide. They have the following:  Risk Factors: mental health diagnosis, substance use, recent loss, limited family/friend support, hx of inpatient psychiatric admissions, recently stopped taking his medications, previous suicide attempt Protective Factors: help-seeking behavior, connected with an outpatient mental health provider, safe/stable housing      Collateral Information:  Will obtain collateral information from the family or friends. Will obtain medical records as appropriate from out patient providers  Will consult the hospitalist for a physical exam to rule out any co-morbid physical condition. Home medication Reconciled   Reviewed and continue as clinically indicated    New Medications started during this admission :    Depakote 500 mg twice daily for mood stabilization  Abilify 5 mg daily we will optimize this medication  Will discuss the Abilify long-acting injection with the patient as this may help him remain compliant    Prn Haldol 5mg and Vistaril 50mg q6hr for extreme agitation. Trazodone as ordered for insomnia  Vistaril as ordered for anxiety      Psychotherapy:   Encourage participation in milieu and group therapy  Individual therapy as needed      NOTE: This report was transcribed using voice recognition software. Every effort was made to ensure accuracy; however, inadvertent computerized transcription errors may be present.       Behavioral Services  Medicare Certification Upon Admission    I certify that this patient's inpatient psychiatric hospital admission is medically necessary for:    [x] (1) Treatment which could reasonably be expected to improve this patient's condition,       [x] (2) Or for diagnostic study;     AND     [x](2) The inpatient psychiatric services are provided while the individual is under the care of a physician and are included in the individualized plan of care.     Estimated length of stay/service 3 - 5 days based on stability     Plan for post-hospital care follow with OP provider     Electronically signed by SAADIA Kurtz CNP on 10/2/2022 at 11:28 AM          Electronically signed by SAADIA Kurtz CNP on 10/2/2022 at 7:33 AM

## 2022-10-02 NOTE — BH NOTE
Patient given 50 mg hydroxyzine d/t anxiety rated 10/10 r/t obnoxious, loud, patient on same unit. Pt education provided; pt understanding of same. Will continue to monitor.

## 2022-10-02 NOTE — PROGRESS NOTES
Unable to verify home meds  [facility] called they stated they don't have any records for the pt   pt lives at [address] however [facility] is closed today

## 2022-10-02 NOTE — GROUP NOTE
Group Therapy Note    Date: 10/2/2022    Group Start Time: 1700  Group End Time: 4939  Group Topic: Cognitive Skills    [facility]    Thankful ROSLEINE Mullins, TYESHA        Group Therapy Note    Attendees: 8       Patient's Goal:  Patient will learn about trauma, the risk factors, symptoms and therapies available for treatment. Notes:  Patient was an active listener in group therapy. Status After Intervention:  Unchanged    Participation Level: Active Listener    Participation Quality: Attentive      Speech:  normal      Thought Process/Content: Logical      Affective Functioning: Congruent      Mood: euthymic      Level of consciousness:  Alert and Attentive      Response to Learning: Able to verbalize current knowledge/experience      Endings: None Reported    Modes of Intervention: Education, Support, Socialization, Exploration, Clarifying, and Problem-solving      Discipline Responsible: /Counselor      Signature:   ROSELINE Rose, TYESHA

## 2022-10-02 NOTE — PLAN OF CARE
Problem: Depression/Self Harm  Goal: Effect of psychiatric condition will be minimized and patient will be protected from self harm  Description: INTERVENTIONS:  1. Assess impact of patient's symptoms on level of functioning, self care needs and offer support as indicated  2. Assess patient/family knowledge of depression, impact on illness and need for teaching  3. Provide emotional support, presence and reassurance  4. Assess for possible suicidal thoughts or ideation. If patient expresses suicidal thoughts or statements do not leave alone, initiate Suicide Precautions, move to a room close to the nursing station and obtain sitter  5. Initiate consults as appropriate with Mental Health Professional, Spiritual Care, Psychosocial CNS, and consider a recommendation to the LIP for a Psychiatric Consultation  Outcome: Progressing     Problem: Involuntary Admit  Goal: Will cooperate with staff recommendations and doctor's orders and will demonstrate appropriate behavior  Description: INTERVENTIONS:  1. Treat underlying conditions and offer medication as ordered  2. Educate regarding involuntary admission procedures and rules  3. Contain excessive/inappropriate behavior per unit and hospital policies  Outcome: Progressing   Pt is cooperative, but irritable, asking why he is on [address] with\"old people\" and states that \"I don't feel comfortable around old people\", as well as stating several times that he just wants to go home. Pt is now resting quietly in bed with eyes closed and even, unlabored respirations. No issues noted at this time, will continue Q 15 minute checks.

## 2022-10-02 NOTE — GROUP NOTE
Group Therapy Note    Date: 10/2/2022    Group Start Time: 1130  Group End Time: 6506  Group Topic: Psychoeducation    [facility]        Group Therapy Note    Attendees:  9       Notes:  Minimally engaged during discussion group. Easily distracted and anxious due to peer behavior during beginning of group. Left early to ask for medication.       Status After Intervention:  Unchanged    Participation Level: Minimal    Participation Quality: Appropriate      Speech:  normal      Thought Process/Content: Perseverating      Affective Functioning: Flat      Mood: anxious and elevated      Level of consciousness:  Alert      Response to Learning: Progressing to goal      Endings: None Reported    Modes of Intervention: Education, Support, Socialization, and Exploration      Discipline Responsible: Psychoeducational Specialist      Signature:  Cathlyn Cockayne, [place]

## 2022-10-03 PROCEDURE — 6370000000 HC RX 637 (ALT 250 FOR IP): Performed by: NURSE PRACTITIONER

## 2022-10-03 PROCEDURE — 1240000000 HC EMOTIONAL WELLNESS R&B

## 2022-10-03 PROCEDURE — 99232 SBSQ HOSP IP/OBS MODERATE 35: CPT | Performed by: NURSE PRACTITIONER

## 2022-10-03 RX ADMIN — ARIPIPRAZOLE 10 MG: 10 TABLET ORAL at 08:52

## 2022-10-03 RX ADMIN — DIVALPROEX SODIUM 500 MG: 500 TABLET, DELAYED RELEASE ORAL at 21:09

## 2022-10-03 RX ADMIN — DIVALPROEX SODIUM 500 MG: 500 TABLET, DELAYED RELEASE ORAL at 08:52

## 2022-10-03 NOTE — CARE COORDINATION
GENNARO met with this pt for a daily check in. Pt observed laying in his room. Pt states that he is feeling \"not so good\" today. Pt states that he would like to be transferred to the other unit today so that he can be with patients his own age. SW verbalized understanding. Pt is currently denying SI/ HI/ hallucinations/ delusions. Pt states that he will return to the [facility] At discharge. Pt is somewhat discharge focused. Pt may be able to utilize the bus route for transportation. Pt states that he currently treats with [facility] in [place]. SW will continue to monitor this pt's moods and behaviors.

## 2022-10-03 NOTE — BH NOTE
Patient has been isolative to his room for most of this shift. Patient continues to have suicidal ideations but denies plan or intent and contracts for safety. Patient denies HI/AVH. Patient just stares at the wall while talking to this nurse and does not answer questions related to anxiety and depression. Patient is flat and blunt but cooperative. Patient is encouraged to continue to work towards discharge goal by complying with medications, attending groups and to seek staff if feelings are overwhelming. Environmental rounds completed per unit policy to maintain safety of everyone on the unit. Staff will offer support and interventions as requested or required.

## 2022-10-03 NOTE — PLAN OF CARE
Problem: Anxiety  Goal: Will report anxiety at manageable levels  Description: INTERVENTIONS:  1. Administer medication as ordered  2. Teach and rehearse alternative coping skills  3. Provide emotional support with 1:1 interaction with staff  Outcome: Progressing     Problem: Coping  Goal: Pt/Family able to verbalize concerns and demonstrate effective coping strategies  Description: INTERVENTIONS:  1. Assist patient/family to identify coping skills, available support systems and cultural and spiritual values  2. Provide emotional support, including active listening and acknowledgement of concerns of patient and caregivers  3. Reduce environmental stimuli, as able  4. Instruct patient/family in relaxation techniques, as appropriate  5. Assess for spiritual pain/suffering and initiate Spiritual Care, Psychosocial Clinical Specialist consults as needed  Outcome: Progressing     Problem: Involuntary Admit  Goal: Will cooperate with staff recommendations and doctor's orders and will demonstrate appropriate behavior  Description: INTERVENTIONS:  1. Treat underlying conditions and offer medication as ordered  2. Educate regarding involuntary admission procedures and rules  3.  Contain excessive/inappropriate behavior per unit and hospital policies  Outcome: Progressing

## 2022-10-03 NOTE — PROGRESS NOTES
BEHAVIORAL HEALTH FOLLOW-UP NOTE     10/3/2022     Patient was seen and examined in person, Chart reviewed   Patient's case discussed with staff/team    Chief Complaint: Do you think I can go to the other unit? Interim History:     Patient up in the day room, he is exaggerated, intrusive, states that he would like to go to the other unit. He is telling me that his allergies are really bad today. He makes good eye contact, he is impulsive, however remains in control of his behaviors, he tells me that he does not feel comfortable on this unit. He is taking his medications, up in the milieu. Appetite:   [x] Normal/Unchanged  [] Increased  [] Decreased      Sleep:       [x] Normal/Unchanged  [] Fair       [] Poor              Energy:    [x] Normal/Unchanged  [] Increased  [] Decreased        SI [] Present  [x] Absent    HI  []Present  [x] Absent     Aggression:  [] yes  [x] no    Patient is [x] able  [] unable to CONTRACT FOR SAFETY     PAST MEDICAL/PSYCHIATRIC HISTORY:   Past Medical History:   Diagnosis Date    Autism     History of suicidal tendencies     OCD (obsessive compulsive disorder)     [facility]        FAMILY/SOCIAL HISTORY:  No family history on file.   Social History     Socioeconomic History    Marital status: Single     Spouse name: Not on file    Number of children: Not on file    Years of education: Not on file    Highest education level: Not on file   Occupational History    Not on file   Tobacco Use    Smoking status: Every Day     Packs/day: 1.00     Types: Cigarettes    Smokeless tobacco: Never   Vaping Use    Vaping Use: Never used   Substance and Sexual Activity    Alcohol use: Not Currently    Drug use: Never    Sexual activity: Not Currently   Other Topics Concern    Not on file   Social History Narrative    Not on file     Social Determinants of Health     Financial Resource Strain: Not on file   Food Insecurity: Not on file   Transportation Needs: Not on file   Physical Activity: Not on file   Stress: Not on file   Social Connections: Not on file   Intimate Partner Violence: Not on file   Housing Stability: Not on file           ROS:  [x] All negative/unchanged except if checked.  Explain positive(checked items) below:  [] Constitutional  [] Eyes  [] Ear/Nose/Mouth/Throat  [] Respiratory  [] CV  [] GI  []   [] Musculoskeletal  [] Skin/Breast  [] Neurological  [] Endocrine  [] Heme/Lymph  [] Allergic/Immunologic    Explanation:     MEDICATIONS:    Current Facility-Administered Medications:     ARIPiprazole (ABILIFY) tablet 10 mg, 10 mg, Oral, Daily, SAADIA Baker CNP    divalproex (DEPAKOTE) DR tablet 500 mg, 500 mg, Oral, 2 times per day, SAADIA Baker CNP, 500 mg at 10/02/22 2131    magnesium hydroxide (MILK OF MAGNESIA) 400 MG/5ML suspension 30 mL, 30 mL, Oral, Daily PRN, Benson Pimentel MD    aluminum & magnesium hydroxide-simethicone (MAALOX) 200-200-20 MG/5ML suspension 30 mL, 30 mL, Oral, PRN, Benson Pimentel MD    hydrOXYzine pamoate (VISTARIL) capsule 50 mg, 50 mg, Oral, TID PRN, Benson Pimentel MD, 50 mg at 10/02/22 1211    haloperidol (HALDOL) tablet 5 mg, 5 mg, Oral, Q6H PRN **OR** haloperidol lactate (HALDOL) injection 5 mg, 5 mg, IntraMUSCular, Q6H PRN, Benson Pimentel MD    melatonin tablet 3 mg, 3 mg, Oral, Nightly, Benson Pimentel MD, 3 mg at 10/01/22 2112    nicotine polacrilex (NICORETTE) gum 4 mg, 4 mg, Oral, Q2H PRN, Benson Pimentel MD, 4 mg at 10/02/22 1827      Examination:  BP (!) 107/51   Pulse 72   Temp 98.2 °F (36.8 °C) (Temporal)   Resp 18   SpO2 95%   Gait - steady  Medication side effects(SE): None reported    Mental Status Examination:    Level of consciousness:  within normal limits   Appearance:  well-appearing  Behavior/Motor:  hyperactive  Attitude toward examiner:  cooperative and attentive  Speech:  loud and pressured   Mood: \"I am depressed\"  Affect:  mood incongruent, patient is bright, energetic   Thought processes:  goal directed   Thought content: The patient is devoid of suicidal or homicidal ideation intent or plan currently however was presented to [facility] emergency department after having a knife to his neck prior to de-escalation from the police. Devoid of auditory or visual hallucinations or other perceptual disturbances, there are no overt or covert signs of psychosis or paranoia. There are no neurovegetative signs of depression. Cognition:  oriented to person, place, and time   Concentration distractible  Memory intact  Insight poor   Judgement poor   Fund of Knowledge adequate       ASSESSMENT:   Patient symptoms are:  [] Well controlled  [] Improving  [] Worsening  [x] No change      Diagnosis:   Principal Problem:    Severe mixed bipolar disorder (HCC)  Active Problems:    Autism spectrum disorder    Borderline intellectual functioning  Resolved Problems:    Depression      LABS:    Recent Labs     09/30/22 2050   WBC 9.7   HGB 14.2        Recent Labs     09/30/22 2050   *   K 4.2   *   CO2 27   BUN 9   CREATININE 0.9   GLUCOSE 99     Recent Labs     09/30/22 2050   BILITOT <0.2   ALKPHOS 119   AST 40*   ALT 79*     Lab Results   Component Value Date/Time    LABAMPH NOT DETECTED 09/30/2022 08:51 PM    BARBSCNU NOT DETECTED 09/30/2022 08:51 PM    LABBENZ NOT DETECTED 09/30/2022 08:51 PM    LABMETH NOT DETECTED 09/30/2022 08:51 PM    OPIATESCREENURINE NOT DETECTED 09/30/2022 08:51 PM    PHENCYCLIDINESCREENURINE NOT DETECTED 09/30/2022 08:51 PM    ETOH <10 09/30/2022 08:50 PM     Lab Results   Component Value Date/Time    TSH 4.360 08/20/2021 12:04 AM     No results found for: LITHIUM  No results found for: VALPROATE, CBMZ        Treatment Plan:  The patient's diagnosis, treatment plan, medication management were formulated after patient was seen directly by the attending physician and myself and all relevant documentation was reviewed.      Risk, benefit, side effects, possible outcomes of the medication and alternatives discussed with the patient and the patient demonstrated understanding. The patient was also educated that the outcome of treatment will depend on the medication compliance as directed by the prescribers along with regular follow-up, compliance with the labs and other work-up, as clinically indicated. Risk Management: Based on the diagnosis and assessment biopsychosocial treatment model was presented to the patient and was given the opportunity to ask any question. The patient was agreeable to the plan and all the patient's questions were answered to the patient's satisfaction. I discussed with the patient the risk, benefit, alternative and common side effects for the proposed medication treatment. The patient is consenting to this treatment. New Medications started during this admission :    Depakote 500 mg twice daily for mood stabilization  Abilify 5 mg daily we will optimize this medication  Will discuss the Abilify long-acting injection with the patient as this may help him remain compliant    Collateral information: Followed by social work  CD evaluation  Encourage patient to attend group and other milieu activities. Discharge planning discussed with the patient and treatment team.    PSYCHOTHERAPY/COUNSELING:  [x] Therapeutic interview  [x] Supportive  [] CBT  [] Ongoing  [] Other    [x] Patient continues to need, on a daily basis, active treatment furnished directly by or requiring the supervision of inpatient psychiatric personnel      Anticipated Length of stay: 3 to 5 days based on stability       NOTE: This report was transcribed using voice recognition software. Every effort was made to ensure accuracy; however, inadvertent computerized transcription errors may be present.     Electronically signed by SAADIA Hernandez CNP on 10/3/2022 at 8:26 AM

## 2022-10-03 NOTE — BH NOTE
Verified medications with [facility] pharmacy. Pharmacist states that medications have been prescribed but never picked up. These include Depakote  mg BID, Keppra 500 mg BID, and Perseris 120 mg Q30 days (last dose per pharmacy 8/14/2022).

## 2022-10-03 NOTE — PLAN OF CARE
Problem: Anxiety  Goal: Will report anxiety at manageable levels  Description: INTERVENTIONS:  1. Administer medication as ordered  2. Teach and rehearse alternative coping skills  3. Provide emotional support with 1:1 interaction with staff  Outcome: Not Progressing    Pt verb and behavior indicate anxiety; pt rates anxiety 2/10 which is incongruent with behaviors and body language     Problem: Depression/Self Harm  Goal: Effect of psychiatric condition will be minimized and patient will be protected from self harm  Description: INTERVENTIONS:  1. Assess impact of patient's symptoms on level of functioning, self care needs and offer support as indicated  2. Assess patient/family knowledge of depression, impact on illness and need for teaching  3. Provide emotional support, presence and reassurance  4. Assess for possible suicidal thoughts or ideation. If patient expresses suicidal thoughts or statements do not leave alone, initiate Suicide Precautions, move to a room close to the nursing station and obtain sitter  5.  Initiate consults as appropriate with Mental Health Professional, Spiritual Care, Psychosocial CNS, and consider a recommendation to the LIP for a Psychiatric Consultation  Outcome: Not Progressing     Pt denies SI but admits to depression

## 2022-10-04 PROCEDURE — 6370000000 HC RX 637 (ALT 250 FOR IP): Performed by: PSYCHIATRY & NEUROLOGY

## 2022-10-04 PROCEDURE — 6370000000 HC RX 637 (ALT 250 FOR IP): Performed by: NURSE PRACTITIONER

## 2022-10-04 PROCEDURE — 99232 SBSQ HOSP IP/OBS MODERATE 35: CPT | Performed by: NURSE PRACTITIONER

## 2022-10-04 PROCEDURE — 1240000000 HC EMOTIONAL WELLNESS R&B

## 2022-10-04 RX ADMIN — NICOTINE POLACRILEX 4 MG: 2 GUM, CHEWING BUCCAL at 15:24

## 2022-10-04 RX ADMIN — NICOTINE POLACRILEX 4 MG: 2 GUM, CHEWING BUCCAL at 09:19

## 2022-10-04 RX ADMIN — ARIPIPRAZOLE 10 MG: 10 TABLET ORAL at 09:18

## 2022-10-04 RX ADMIN — DIVALPROEX SODIUM 500 MG: 500 TABLET, DELAYED RELEASE ORAL at 20:43

## 2022-10-04 RX ADMIN — HYDROXYZINE PAMOATE 50 MG: 50 CAPSULE ORAL at 20:43

## 2022-10-04 RX ADMIN — DIVALPROEX SODIUM 500 MG: 500 TABLET, DELAYED RELEASE ORAL at 09:19

## 2022-10-04 NOTE — BH NOTE
Patient reassessed at this time. Patient denies any suicidal ideations, homicidal ideations, auditory and visual hallucinations at this time. Patient denies any depression and anxiety at this time. Patient denies any pain at this time. Patient is socializing with other patients in the day room. Patient does not appear in any distress at this time. Will continue to monitor patient every 15 minute rounds to ensure patient safety.

## 2022-10-04 NOTE — PROGRESS NOTES
Pt positive for SI and self harm thoughts. Pt handed the pencil back to this nurse \"So I don't use it. \" Pt is incongruent. Smiling and laughing on the unit and with peers and staff. Presents anxious. Denies HI and AVH. Will continue to monitor.

## 2022-10-04 NOTE — PLAN OF CARE
Problem: Anxiety  Goal: Will report anxiety at manageable levels  Description: INTERVENTIONS:  1. Administer medication as ordered  2. Teach and rehearse alternative coping skills  3. Provide emotional support with 1:1 interaction with staff  Outcome: Progressing  Flowsheets (Taken 10/4/2022 1323)  Will report anxiety at manageable levels:   Administer medication as ordered   Teach and rehearse alternative coping skills   Provide emotional support with 1:1 interaction with staff     Problem: Coping  Goal: Pt/Family able to verbalize concerns and demonstrate effective coping strategies  Description: INTERVENTIONS:  1. Assist patient/family to identify coping skills, available support systems and cultural and spiritual values  2. Provide emotional support, including active listening and acknowledgement of concerns of patient and caregivers  3. Reduce environmental stimuli, as able  4. Instruct patient/family in relaxation techniques, as appropriate  5. Assess for spiritual pain/suffering and initiate Spiritual Care, Psychosocial Clinical Specialist consults as needed  Outcome: Progressing  Flowsheets (Taken 10/4/2022 1323)  Patient/family able to verbalize anxieties, fears, and concerns, and demonstrate effective coping:   Assist patient/family to identify coping skills, available support systems and cultural and spiritual values   Provide emotional support, including active listening and acknowledgement of concerns of patient and caregivers   Reduce environmental stimuli, as able   Instruct patient/family in relaxation techniques, as appropriate   Assess for spiritual pain/suffering and initiate Spiritual Care, Psychosocial Clinical Specialist consults as needed     Problem: Depression/Self Harm  Goal: Effect of psychiatric condition will be minimized and patient will be protected from self harm  Description: INTERVENTIONS:  1.  Assess impact of patient's symptoms on level of functioning, self care needs and offer support as indicated  2. Assess patient/family knowledge of depression, impact on illness and need for teaching  3. Provide emotional support, presence and reassurance  4. Assess for possible suicidal thoughts or ideation. If patient expresses suicidal thoughts or statements do not leave alone, initiate Suicide Precautions, move to a room close to the nursing station and obtain sitter  5. Initiate consults as appropriate with Mental Health Professional, Spiritual Care, Psychosocial CNS, and consider a recommendation to the LIP for a Psychiatric Consultation  Outcome: Progressing  Flowsheets  Taken 10/4/2022 1444 by Sharan Padilla RN  Effect of psychiatric condition will be minimized and patient will be protected from self harm:   Provide emotional support, presence and reassurance   Assess impact of patients symptoms on level of functioning, self care needs and offer support as indicated   Assess patient/family knowledge of depression, impact on illness and need for teaching   Assess for suicidal thoughts or ideation. If patient expresses suicidal thoughts or statements do not leave alone, initiate Suicide Precautions, move near nurse station, obtain sitter   Initiate consults as appropriate with Mental Health Professional, Spiritual Care, Psychosocial CNS, and consider a recommendation to the LIP for a Psychiatric Consultation  Taken 10/4/2022 1323 by Sharan Padilla RN  Effect of psychiatric condition will be minimized and patient will be protected from self harm:   Assess impact of patients symptoms on level of functioning, self care needs and offer support as indicated   Assess patient/family knowledge of depression, impact on illness and need for teaching   Provide emotional support, presence and reassurance   Assess for suicidal thoughts or ideation.  If patient expresses suicidal thoughts or statements do not leave alone, initiate Suicide Precautions, move near nurse station, obtain sitter   Initiate consults as appropriate with Mental Health Professional, Spiritual Care, Psychosocial CNS, and consider a recommendation to the LIP for a Psychiatric Consultation  Taken 10/4/2022 0334 by Janet Tracey RN  Effect of psychiatric condition will be minimized and patient will be protected from self harm:   Assess impact of patients symptoms on level of functioning, self care needs and offer support as indicated   Assess patient/family knowledge of depression, impact on illness and need for teaching   Provide emotional support, presence and reassurance   Assess for suicidal thoughts or ideation. If patient expresses suicidal thoughts or statements do not leave alone, initiate Suicide Precautions, move near nurse station, obtain sitter   Initiate consults as appropriate with Mental Health Professional, Spiritual Care, Psychosocial CNS, and consider a recommendation to the LIP for a Psychiatric Consultation     Problem: Spiritual Care  Goal: Pt/Family able to move forward in process of forgiving self, others, and/or higher power  Description: INTERVENTIONS:  1. Assist patient/family to overcome blocks to healing by use of spiritual practices (prayer, meditation, guided imagery, reiki, breath work, etc). 2. De-myth guilt and help patient/family identify possible irrational spiritual/cultural beliefs and values. 3. Explore possibilities of making amends & reconciliation with self, others, and/or a greater power. 4. Guide patient/family in identifying painful feelings of guilt. 5. Help patient/famiy explore and identify spiritual beliefs, cultural understandings or values that may help or hinder letting go of issue. 6. Help patient/family explore feelings of anger, bitterness, resentment. 7. Help patient/family identify and examine the situation in which these feelings are experienced. 8. Help patient/family identify destructive displacement of feelings onto other individuals.   9. Invite use of sacraments/rituals/ceremonies as appropriate (e.g. - confession, anointing, smudging). 10. Refer patient/family to formal counseling and/or to [facility] for further support work. Outcome: Progressing  Flowsheets (Taken 10/4/2022 1323)  Patient/family able to move forward in process of forgiving self, others, and/or higher power:   Assist patient to overcome blocks to healing by use of spiritual practices (prayer, meditation, guided imagery, reiki, breath work, etc)   De-myth guilt and help patient/family identify possible irrational spiritual/cultural beliefs and values   Guide patient/family in identifying painful feelings of guilt   Explore possibilities of making amends and reconciliation with self, others, and/or a greater power   Help patient explore and identify spiritual beliefs, cultural understandings or values that may help or hinder letting go of issue   Help patient explore feelings of anger, bitterness, resentment   Help patient/family identify and examine the situation in which these feelings are experienced   Help patient/family identify destructive displacement of feelings onto other individuals   Invite use of sacraments/rituals/ceremonies as appropriate (e.g. - confession, anointing, smudging)   Refer patient to formal counseling and/or to [facility] for further support work     Problem: Involuntary Admit  Goal: Will cooperate with staff recommendations and doctor's orders and will demonstrate appropriate behavior  Description: INTERVENTIONS:  1. Treat underlying conditions and offer medication as ordered  2. Educate regarding involuntary admission procedures and rules  3.  Contain excessive/inappropriate behavior per unit and hospital policies  Outcome: Progressing  Flowsheets (Taken 10/4/2022 1323)  Will cooperate with staff recommendations and doctor's orders and will demonstrate appropriate behavior:   Treat underlying conditions and offer medication as ordered   Educate regarding involuntary admission procedures and rules   Contain excessive/inappropriate behavior per unit and hospital policies

## 2022-10-04 NOTE — PROGRESS NOTES
BEHAVIORAL HEALTH FOLLOW-UP NOTE     10/4/2022     Patient was seen and examined in person, Chart reviewed   Patient's case discussed with staff/team    Chief Complaint: \"When can I go home. \"    Interim History:   I saw patient up on the unit he is bright and pleasant very discharge focused he has been multiple times when he can go home. He is loud with a elated affect. He is asking about when he can be discharged he vehemently denies SI/HI intent or plan denies any auditory visual hallucinations states that he was in a standoff with police states he was upset because his family had a  for his grandfather but did not bring him to the       Appetite:   [x] Normal/Unchanged  [] Increased  [] Decreased      Sleep:       [x] Normal/Unchanged  [] Fair       [] Poor              Energy:    [x] Normal/Unchanged  [] Increased  [] Decreased        SI [] Present  [x] Absent    HI  []Present  [x] Absent     Aggression:  [] yes  [x] no    Patient is [x] able  [] unable to CONTRACT FOR SAFETY     PAST MEDICAL/PSYCHIATRIC HISTORY:   Past Medical History:   Diagnosis Date    Autism     History of suicidal tendencies     OCD (obsessive compulsive disorder)     Seizures ([facility] [address]        FAMILY/SOCIAL HISTORY:  No family history on file.   Social History     Socioeconomic History    Marital status: Single     Spouse name: Not on file    Number of children: Not on file    Years of education: Not on file    Highest education level: Not on file   Occupational History    Not on file   Tobacco Use    Smoking status: Every Day     Packs/day: 1.00     Types: Cigarettes    Smokeless tobacco: Never   Vaping Use    Vaping Use: Never used   Substance and Sexual Activity    Alcohol use: Not Currently    Drug use: Never    Sexual activity: Not Currently   Other Topics Concern    Not on file   Social History Narrative    Not on file     Social Determinants of Health     Financial Resource Strain: Not on file   Food Insecurity: Not on file   Transportation Needs: Not on file   Physical Activity: Not on file   Stress: Not on file   Social Connections: Not on file   Intimate Partner Violence: Not on file   Housing Stability: Not on file           ROS:  [x] All negative/unchanged except if checked.  Explain positive(checked items) below:  [] Constitutional  [] Eyes  [] Ear/Nose/Mouth/Throat  [] Respiratory  [] CV  [] GI  []   [] Musculoskeletal  [] Skin/Breast  [] Neurological  [] Endocrine  [] Heme/Lymph  [] Allergic/Immunologic    Explanation:     MEDICATIONS:    Current Facility-Administered Medications:     ARIPiprazole (ABILIFY) tablet 10 mg, 10 mg, Oral, Daily, Frida SAADIA Thurman - CNP, 10 mg at 10/04/22 4873    divalproex (DEPAKOTE) DR tablet 500 mg, 500 mg, Oral, 2 times per day, Frida SAADIA Thurman - CNP, 500 mg at 10/04/22 0919    magnesium hydroxide (MILK OF MAGNESIA) 400 MG/5ML suspension 30 mL, 30 mL, Oral, Daily PRN, Jose C Wyman MD    aluminum & magnesium hydroxide-simethicone (MAALOX) 200-200-20 MG/5ML suspension 30 mL, 30 mL, Oral, PRN, Jose C Wyman MD    hydrOXYzine pamoate (VISTARIL) capsule 50 mg, 50 mg, Oral, TID PRN, Jose C Wyman MD, 50 mg at 10/02/22 1211    haloperidol (HALDOL) tablet 5 mg, 5 mg, Oral, Q6H PRN **OR** haloperidol lactate (HALDOL) injection 5 mg, 5 mg, IntraMUSCular, Q6H PRN, oJse C Wyman MD    melatonin tablet 3 mg, 3 mg, Oral, Nightly, Jose C Wyman MD, 3 mg at 10/01/22 2112    nicotine polacrilex (NICORETTE) gum 4 mg, 4 mg, Oral, Q2H PRN, Jose C Wyman MD, 4 mg at 10/04/22 7897      Examination:  /71   Pulse 67   Temp 98.1 °F (36.7 °C)   Resp 16   SpO2 96%   Gait - steady  Medication side effects(SE): None reported    Mental Status Examination:    Level of consciousness:  within normal limits   Appearance:  well-appearing  Behavior/Motor:  hyperactive  Attitude toward examiner:  cooperative and attentive  Speech:  loud and pressured   Mood: \"I am depressed\"  Affect: mood incongruent, patient is bright, energetic   Thought processes:  goal directed   Thought content: The patient is devoid of suicidal or homicidal ideation intent or plan currently however was presented to [facility] emergency department after having a knife to his neck prior to de-escalation from the police. Devoid of auditory or visual hallucinations or other perceptual disturbances, there are no overt or covert signs of psychosis or paranoia. There are no neurovegetative signs of depression. Cognition:  oriented to person, place, and time   Concentration distractible  Memory intact  Insight poor   Judgement poor   Fund of Knowledge adequate       ASSESSMENT:   Patient symptoms are:  [] Well controlled  [] Improving  [] Worsening  [x] No change      Diagnosis:   Principal Problem:    Severe mixed bipolar disorder (HCC)  Active Problems:    Autism spectrum disorder    Borderline intellectual functioning  Resolved Problems:    Depression      LABS:    No results for input(s): WBC, HGB, PLT in the last 72 hours. No results for input(s): NA, K, CL, CO2, BUN, CREATININE, GLUCOSE in the last 72 hours. No results for input(s): BILITOT, ALKPHOS, AST, ALT in the last 72 hours. Lab Results   Component Value Date/Time    LABAMPH NOT DETECTED 09/30/2022 08:51 PM    BARBSCNU NOT DETECTED 09/30/2022 08:51 PM    LABBENZ NOT DETECTED 09/30/2022 08:51 PM    LABMETH NOT DETECTED 09/30/2022 08:51 PM    OPIATESCREENURINE NOT DETECTED 09/30/2022 08:51 PM    PHENCYCLIDINESCREENURINE NOT DETECTED 09/30/2022 08:51 PM    ETOH <10 09/30/2022 08:50 PM     Lab Results   Component Value Date/Time    TSH 4.360 08/20/2021 12:04 AM     No results found for: LITHIUM  No results found for: VALPROATE, CBMZ        Treatment Plan:  The patient's diagnosis, treatment plan, medication management were formulated after patient was seen directly by the attending physician and myself and all relevant documentation was reviewed. Risk, benefit, side effects, possible outcomes of the medication and alternatives discussed with the patient and the patient demonstrated understanding. The patient was also educated that the outcome of treatment will depend on the medication compliance as directed by the prescribers along with regular follow-up, compliance with the labs and other work-up, as clinically indicated. Risk Management: Based on the diagnosis and assessment biopsychosocial treatment model was presented to the patient and was given the opportunity to ask any question. The patient was agreeable to the plan and all the patient's questions were answered to the patient's satisfaction. I discussed with the patient the risk, benefit, alternative and common side effects for the proposed medication treatment. The patient is consenting to this treatment. New Medications started during this admission :    Depakote 500 mg twice daily for mood stabilization  Continue Abilify 10 mg daily Will plan for Aristada injection    Collateral information: Followed by social work  CD evaluation  Encourage patient to attend group and other milieu activities. Discharge planning discussed with the patient and treatment team.    PSYCHOTHERAPY/COUNSELING:  [x] Therapeutic interview  [x] Supportive  [] CBT  [] Ongoing  [] Other    [x] Patient continues to need, on a daily basis, active treatment furnished directly by or requiring the supervision of inpatient psychiatric personnel      Anticipated Length of stay: 3 to 5 days based on stability       NOTE: This report was transcribed using voice recognition software. Every effort was made to ensure accuracy; however, inadvertent computerized transcription errors may be present.     Electronically signed by SAADIA Jurado CNP on 44/4/2124 at 2:09 PM

## 2022-10-04 NOTE — BH NOTE
Patient alert and oriented x 4. Patient has a flat, sad, blunt affect and appears depressed and anxious. Patient is withdrawn, guarded and appears preoccupied. Patient is exhibiting flight of ideas. Patient denies any suicidal ideations, homicidal ideations, auditory and visual hallucinations. Patient denies any depression and anxiety at this time. Patient denies any pain at this time. Patient is in day room socializing with other patients. Patients states goal is to \"get out of here and go play my Playstation. \" Patient does not appear in any distress at this time. Will continue to monitor patient every 15 minute rounds to ensure patient safety.

## 2022-10-05 VITALS
TEMPERATURE: 96.8 F | OXYGEN SATURATION: 96 % | SYSTOLIC BLOOD PRESSURE: 118 MMHG | RESPIRATION RATE: 18 BRPM | DIASTOLIC BLOOD PRESSURE: 76 MMHG | HEART RATE: 115 BPM

## 2022-10-05 LAB
SARS-COV-2, NAAT: NOT DETECTED
VALPROIC ACID LEVEL: 75 MCG/ML (ref 50–100)

## 2022-10-05 PROCEDURE — 36415 COLL VENOUS BLD VENIPUNCTURE: CPT

## 2022-10-05 PROCEDURE — 80164 ASSAY DIPROPYLACETIC ACD TOT: CPT

## 2022-10-05 PROCEDURE — 6370000000 HC RX 637 (ALT 250 FOR IP): Performed by: NURSE PRACTITIONER

## 2022-10-05 PROCEDURE — 99239 HOSP IP/OBS DSCHRG MGMT >30: CPT | Performed by: NURSE PRACTITIONER

## 2022-10-05 PROCEDURE — 87635 SARS-COV-2 COVID-19 AMP PRB: CPT

## 2022-10-05 PROCEDURE — 6370000000 HC RX 637 (ALT 250 FOR IP): Performed by: PSYCHIATRY & NEUROLOGY

## 2022-10-05 RX ORDER — ARIPIPRAZOLE 15 MG/1
15 TABLET ORAL DAILY
Status: DISCONTINUED | OUTPATIENT
Start: 2022-10-06 | End: 2022-10-05 | Stop reason: HOSPADM

## 2022-10-05 RX ORDER — ARIPIPRAZOLE 15 MG/1
15 TABLET ORAL DAILY
Qty: 30 TABLET | Refills: 0 | Status: SHIPPED | OUTPATIENT
Start: 2022-10-06 | End: 2022-11-05

## 2022-10-05 RX ADMIN — HYDROXYZINE PAMOATE 50 MG: 50 CAPSULE ORAL at 09:40

## 2022-10-05 RX ADMIN — DIVALPROEX SODIUM 500 MG: 500 TABLET, DELAYED RELEASE ORAL at 09:40

## 2022-10-05 RX ADMIN — ARIPIPRAZOLE 10 MG: 10 TABLET ORAL at 09:40

## 2022-10-05 ASSESSMENT — PAIN SCALES - GENERAL: PAINLEVEL_OUTOF10: 0

## 2022-10-05 NOTE — PLAN OF CARE
Problem: Coping  Goal: Pt/Family able to verbalize concerns and demonstrate effective coping strategies  Description: INTERVENTIONS:  1. Assist patient/family to identify coping skills, available support systems and cultural and spiritual values  2. Provide emotional support, including active listening and acknowledgement of concerns of patient and caregivers  3. Reduce environmental stimuli, as able  4. Instruct patient/family in relaxation techniques, as appropriate  5. Assess for spiritual pain/suffering and initiate Spiritual Care, Psychosocial Clinical Specialist consults as needed  10/4/2022 2019 by Linsey Smart RN  Outcome: Progressing     Problem: Depression/Self Harm  Goal: Effect of psychiatric condition will be minimized and patient will be protected from self harm  Description: INTERVENTIONS:  1. Assess impact of patient's symptoms on level of functioning, self care needs and offer support as indicated  2. Assess patient/family knowledge of depression, impact on illness and need for teaching  3. Provide emotional support, presence and reassurance  4. Assess for possible suicidal thoughts or ideation. If patient expresses suicidal thoughts or statements do not leave alone, initiate Suicide Precautions, move to a room close to the nursing station and obtain sitter  5.  Initiate consults as appropriate with Mental Health Professional, Spiritual Care, Psychosocial CNS, and consider a recommendation to the LIP for a Psychiatric Consultation  10/4/2022 2019 by Linsey Smart RN  Outcome: Progressing  Flowsheets (Taken 10/4/2022 1500 by Consuelo Colunga RN)  Effect of psychiatric condition will be minimized and patient will be protected from self harm:   Assess impact of patients symptoms on level of functioning, self care needs and offer support as indicated   Assess patient/family knowledge of depression, impact on illness and need for teaching   Provide emotional support, presence and reassurance Assess for suicidal thoughts or ideation. If patient expresses suicidal thoughts or statements do not leave alone, initiate Suicide Precautions, move near nurse station, obtain sitter   Initiate consults as appropriate with Mental Health Professional, Spiritual Care, Psychosocial CNS, and consider a recommendation to the LIP for a Psychiatric Consultation     Patient denies suicidal/homicidal ideations/ AVH. Denies depression and anxiety. A&O x 4. Patient stated \" I did not really go to group because of my allergies and was coughing too much\" Vistaril prn offered. Observed in day area and social with peers. No sxs of distress noted. No behaviors noted. Purposeful rounds continued Q 15 minutes.

## 2022-10-05 NOTE — PROGRESS NOTES
Patient resting quiet to self at this time, respirations are even and unlabored, no signs or symptoms of distress or discomfort. PRN Vistariladministered this shift. Staff will continue to conduct environmental rounds to ensure the safety of everyone on the unit. Staff will provide support and interventions as requested or required.

## 2022-10-05 NOTE — PLAN OF CARE
Problem: Anxiety  Goal: Will report anxiety at manageable levels  Description: INTERVENTIONS:  1. Administer medication as ordered  2. Teach and rehearse alternative coping skills  3. Provide emotional support with 1:1 interaction with staff  Outcome: Progressing  Flowsheets (Taken 10/5/2022 0856)  Will report anxiety at manageable levels:   Provide emotional support with 1:1 interaction with staff   Teach and rehearse alternative coping skills     Problem: Coping  Goal: Pt/Family able to verbalize concerns and demonstrate effective coping strategies  Description: INTERVENTIONS:  1. Assist patient/family to identify coping skills, available support systems and cultural and spiritual values  2. Provide emotional support, including active listening and acknowledgement of concerns of patient and caregivers  3. Reduce environmental stimuli, as able  4. Instruct patient/family in relaxation techniques, as appropriate  5. Assess for spiritual pain/suffering and initiate Spiritual Care, Psychosocial Clinical Specialist consults as needed  10/5/2022 0859 by Vinny Randall RN  Outcome: Progressing  10/4/2022 2019 by Toño Perry RN  Outcome: Progressing     Problem: Depression/Self Harm  Goal: Effect of psychiatric condition will be minimized and patient will be protected from self harm  Description: INTERVENTIONS:  1. Assess impact of patient's symptoms on level of functioning, self care needs and offer support as indicated  2. Assess patient/family knowledge of depression, impact on illness and need for teaching  3. Provide emotional support, presence and reassurance  4. Assess for possible suicidal thoughts or ideation. If patient expresses suicidal thoughts or statements do not leave alone, initiate Suicide Precautions, move to a room close to the nursing station and obtain sitter  5.  Initiate consults as appropriate with Mental Health Professional, Spiritual Care, Psychosocial CNS, and consider a recommendation to the LIP for a Psychiatric Consultation  10/5/2022 0859 by Macrina Yarbrough RN  Outcome: Progressing  10/4/2022 2019 by Lindalee Goodpasture, RN  Outcome: Progressing  Flowsheets (Taken 10/4/2022 1500 by Wood Grady RN)  Effect of psychiatric condition will be minimized and patient will be protected from self harm:   Assess impact of patients symptoms on level of functioning, self care needs and offer support as indicated   Assess patient/family knowledge of depression, impact on illness and need for teaching   Provide emotional support, presence and reassurance   Assess for suicidal thoughts or ideation. If patient expresses suicidal thoughts or statements do not leave alone, initiate Suicide Precautions, move near nurse station, obtain sitter   Initiate consults as appropriate with Mental Health Professional, Spiritual Care, Psychosocial CNS, and consider a recommendation to the LIP for a Psychiatric Consultation     Problem: Spiritual Care  Goal: Pt/Family able to move forward in process of forgiving self, others, and/or higher power  Description: INTERVENTIONS:  1. Assist patient/family to overcome blocks to healing by use of spiritual practices (prayer, meditation, guided imagery, reiki, breath work, etc). 2. De-myth guilt and help patient/family identify possible irrational spiritual/cultural beliefs and values. 3. Explore possibilities of making amends & reconciliation with self, others, and/or a greater power. 4. Guide patient/family in identifying painful feelings of guilt. 5. Help patient/famiy explore and identify spiritual beliefs, cultural understandings or values that may help or hinder letting go of issue. 6. Help patient/family explore feelings of anger, bitterness, resentment. 7. Help patient/family identify and examine the situation in which these feelings are experienced. 8. Help patient/family identify destructive displacement of feelings onto other individuals.   9. Invite use of sacraments/rituals/ceremonies as appropriate (e.g. - confession, anointing, smudging). 10. Refer patient/family to formal counseling and/or to ely community for further support work. Outcome: Progressing     Problem: Involuntary Admit  Goal: Will cooperate with staff recommendations and doctor's orders and will demonstrate appropriate behavior  Description: INTERVENTIONS:  1. Treat underlying conditions and offer medication as ordered  2. Educate regarding involuntary admission procedures and rules  3. Contain excessive/inappropriate behavior per unit and hospital policies  Outcome: Progressing     1100 hours, staff obtained COVID test, them immediately taken to lab. Received report from previous shift, alert and oriented x 4, pleasant and cooperative, asks repetitive questions, does repetitive acts, flat affect, fixated on discharge, needy, expects immediate gratification. Pt medication compliant, attends groups, denies suicidal or homicidal ideation, contracts for safety, denies hallucinations, no somatic complaints noted, Q 15 minute checks for his/her safety and protection.

## 2022-10-05 NOTE — PROGRESS NOTES
CLINICAL PHARMACY NOTE: MEDS TO BEDS    Total # of Prescriptions Filled: 1   The following medications were delivered to the patient:  Aripiprazole 15mg      Additional Documentation:  Delivered to Mildred Connolly RN 10-5-22

## 2022-10-05 NOTE — CARE COORDINATION
Pt approached sw and told sw that he is feeling good and is ready to discharge home. Pt reports that he does not see a need to be in the hospital anymore and would like sw to inform the treatment team of this. Pt pleasant and bright, speech pressured. Pt denies SI/HI/AVH. Pt rpeorts that he plans to return home and take a bus to get there. Sw contacted [facility]  to schedule appointments for pt. They report that pt is not able to be scheduled at their agency due to chronic no shows. Sw met with pt to discuss follow up agencies. Pt reports that he would like telehealth with [facility] in [facility]. Sw contacted [facility]. Pt is not able to be scheduled with [facility] due to non-compliance with appointments. Sw met with pt to discuss outpatient providers. Pt found laying in his bed, sits up during discussion and shares fair eye contact. PT irritable, states that he is agreeable to any outpatient provider, but that it is only going to stress him out and make him be here longer. Sw contacted [facility] . No answer, sw left voicemail. Sw contacted [facility]  and referred pt for services. Pt has counseling appointment 10/13 at 10:30am. They will refer pt for psychiatric services at this appointment. They are aware of pt's long acting injection due 11/4.      In order to ensure appropriate transition and discharge planning is in place, the following documents have been transmitted to [facility], as the new outpatient provider:    The d/c diagnosis was transmitted to the next care provider  The reason for hospitalization was transmitted to the next care provider  The d/c medications (dosage and indication) were transmitted to the next care provider   The continuing care plan was transmitted to the next care provider

## 2022-10-05 NOTE — BH NOTE
[facility] Interdisciplinary Treatment Plan Note     Review Date & Time: 10/5/2022   11:20 AM     Patient was not in treatment team.    Estimated Length of Stay Update:  7 days   Estimated Discharge Date Update: 7 days    EDUCATION:   Learner Progress Toward Treatment Goals: Reviewed results and recommendations of this team, Reviewed group plan and strategies, Reviewed signs, symptoms and risk of self harm and violent behavior, and Reviewed goals and plan of care    Method: Individual    Outcome: Verbalized understanding and Needs reinforcement    PATIENT GOALS: to go home    PLAN/TREATMENT RECOMMENDATIONS UPDATE: encourage groups    GOALS UPDATE:  Time frame for Short-Term Goals: 7 days      Heidi Marshall RN

## 2022-10-06 NOTE — DISCHARGE SUMMARY
DISCHARGE SUMMARY      Patient ID:  Shar Griffiths  [id number]  69 y.o.  2000    Admit date: 10/1/2022    Discharge date and time: 10/5/22    Admitting Physician: Kiel Hallman MD     Discharge Physician: Dr Conor Burns MD    Discharge Diagnoses:   Patient Active Problem List   Diagnosis    Suicidal ideation    Severe episode of recurrent major depressive disorder, without psychotic features, mixed(HCC)    Autism spectrum disorder    Borderline intellectual functioning    Severe mixed bipolar disorder ([facility] [address]       Admission Condition: poor    Discharged Condition: stable    Admission Circumstance: Patient presented to [facility] ED via EMS for psychiatric evaluation after he was threatening to cut his throat with a knife, the police came and de-escalated the situation and brought the patient here. He was pink slipped for psychiatric treatment. PAST MEDICAL/PSYCHIATRIC HISTORY:   Past Medical History:   Diagnosis Date    Autism     History of suicidal tendencies     OCD (obsessive compulsive disorder)     Seizures ([facility]        FAMILY/SOCIAL HISTORY:  No family history on file.   Social History     Socioeconomic History    Marital status: Single     Spouse name: Not on file    Number of children: Not on file    Years of education: Not on file    Highest education level: Not on file   Occupational History    Not on file   Tobacco Use    Smoking status: Every Day     Packs/day: 1.00     Types: Cigarettes    Smokeless tobacco: Never   Vaping Use    Vaping Use: Never used   Substance and Sexual Activity    Alcohol use: Not Currently    Drug use: Never    Sexual activity: Not Currently   Other Topics Concern    Not on file   Social History Narrative    Not on file     Social Determinants of Health     Financial Resource Strain: Not on file   Food Insecurity: Not on file   Transportation Needs: Not on file   Physical Activity: Not on file   Stress: Not on file   Social Connections: Not on file   Intimate Partner Violence: Not on file   Housing Stability: Not on file       MEDICATIONS:  No current facility-administered medications for this encounter. Current Outpatient Medications:     ARIPiprazole (ABILIFY) 15 MG tablet, Take 1 tablet by mouth daily, Disp: 30 tablet, Rfl: 0    [START ON 11/4/2022] ARIPiprazole lauroxil (ARISTADA) 662 MG/2.4ML PRSY injection, Inject 2.4 mLs into the muscle every 30 days for 1 dose Next injection is due 11/4/22, Disp: 2.4 mL, Rfl: 0    nicotine polacrilex (NICORETTE) 4 MG gum, Take 1 each by mouth every 2 hours as needed for Smoking cessation, Disp: 110 each, Rfl: 0    risperiDONE ER (PERSERIS) 120 MG PRSY subcutaneous injection, Inject 120 mg into the skin every 30 days Last dose 8/14/2022, Disp: , Rfl:     divalproex (DEPAKOTE) 500 MG DR tablet, Take 500 mg by mouth 2 times daily, Disp: , Rfl:     sertraline (ZOLOFT) 50 MG tablet, , Disp: , Rfl:     levETIRAcetam (KEPPRA) 500 MG tablet, Take 1 tablet by mouth 2 times daily, Disp: 60 tablet, Rfl: 0    venlafaxine (EFFEXOR) 100 MG tablet, Take 100 mg by mouth 3 times daily (Patient not taking: Reported on 10/3/2022), Disp: , Rfl:     Examination:  /76   Pulse (!) 115   Temp 96.8 °F (36 °C)   Resp 18   SpO2 96%   Gait - steady    HOSPITAL COURSE[de-identified]   Patient was admitted to the unit on 10/1/2022 was closely monitored for suicidal ideations. He was evaluated was treated with Depakote 500 mg twice daily and Abilify 15 mg daily and was treated the Aristada injection 662 mg IM every 30 days that she received on 10/4/2022. Medical events were insignificant and patient continued to improve on the floor. He start coming out of his room he is attending groups to socializing with peers. He never made any suicidal statements or any suicidal gestures while in the unit. Social workers obtain collateral information from patient's group home where patient resides every voicing concerns that they had for patient to return on discharge. .  Patient is able to voice his future plan spontaneously with richness of detail which includes going home and hanging out with his friends. Patient diagnosed with autism remains a static risk factor for this patient and his risk for mitigated by using a long-acting injection. Treatment team felt the patient obtain the maximum benefit from his hospitalization he was set up with an outpatient mental health agency for outpatient follow-up services. At the time of discharge patient did not show any impulsive behavior. He was up on the unit he was attending groups and socializing with peers. He vehemently denied any suicidal or homicidal ideations intent or plan. He was eating well and sleeping well there are no neurovegetative signs or symptoms of depression he denied any auditory or visual hallucinations. There are no overt or covert signs of psychosis. He was appreciative of the help that he received here. This patient no longer meets criteria for inpatient hospitalization. No AVH or paranoid thoughts  No hopeless or worthless feeling  No active SI/HI  Appetite:  [x] Normal  [] Increased  [] Decreased    Sleep:       [x] Normal  [] Fair       [] Poor            Energy:    [x] Normal  [] Increased  [] Decreased     SI [] Present  [x] Absent  HI  []Present  [x] Absent   Aggression:  [] yes  [x] no  Patient is [x] able  [] unable to CONTRACT FOR SAFETY   Medication side effects(SE):  [x] None(Psych. Meds.) [] Other      Mental Status Examination on discharge:    Level of consciousness:  within normal limits   Appearance:  well-appearing  Behavior/Motor:  no abnormalities noted  Attitude toward examiner:  attentive and good eye contact  Speech:  spontaneous, normal rate and normal volume   Mood: \"My mood is good. \"  Affect: Appropriate and pleasant  Thought processes: Linear without flights of ideas loose associations  Thought content: Devoid of any auditory visual hallucinations delusions or other perceptual normalities. Denies SI/HI intent or plan  Cognition:  oriented to person, place, and time   Concentration intact  Memory intact  Insight good   Judgement fair   Fund of Knowledge adequate      ASSESSMENT:  Patient symptoms are:  [x] Well controlled  [x] Improving  [] Worsening  [] No change    Reason for more than one antipsychotic:  [x] N/A  [] 3 Failed Monotherapy attempts (Drugs tried:)  [] Crossover to a new antipsychotic  [] Taper to Monotherapy from Polypharmacy  [] Augmentation of clozapine therapy due to treatment resistance to single therapy    Diagnosis:  Principal Problem:    Severe mixed bipolar disorder ([facility] [address]  Active Problems:    Autism spectrum disorder    Borderline intellectual functioning  Resolved Problems:    Depression      LABS:    No results for input(s): WBC, HGB, PLT in the last 72 hours. No results for input(s): NA, K, CL, CO2, BUN, CREATININE, GLUCOSE in the last 72 hours. No results for input(s): BILITOT, ALKPHOS, AST, ALT in the last 72 hours. Lab Results   Component Value Date/Time    LABAMPH NOT DETECTED 09/30/2022 08:51 PM    BARBSCNU NOT DETECTED 09/30/2022 08:51 PM    LABBENZ NOT DETECTED 09/30/2022 08:51 PM    LABMETH NOT DETECTED 09/30/2022 08:51 PM    OPIATESCREENURINE NOT DETECTED 09/30/2022 08:51 PM    PHENCYCLIDINESCREENURINE NOT DETECTED 09/30/2022 08:51 PM    ETOH <10 09/30/2022 08:50 PM     Lab Results   Component Value Date/Time    TSH 4.360 08/20/2021 12:04 AM     No results found for: LITHIUM  Lab Results   Component Value Date    VALPROATE 75 10/05/2022       RISK ASSESSMENT AT DISCHARGE: Low risk for suicide and homicide. Treatment Plan:  Reviewed current Medications with the patient. Education provided on the complaince with treatment. Risks, benefits, side effects, drug-to-drug interactions and alternatives to treatment were discussed. Encourage patient to attend outpatient follow up appointment and therapy.     Patient was advised to call the outpatient provider, visit the nearest ED or call 911 if symptoms are not manageable. Patient's family member was contacted prior to the discharge.          Medication List        START taking these medications      ARIPiprazole 15 MG tablet  Commonly known as: ABILIFY  Take 1 tablet by mouth daily  Replaces: Abilify Maintena 400 MG Srer     ARIPiprazole lauroxil 662 MG/2.4ML Prsy injection  Commonly known as: ARISTADA  Inject 2.4 mLs into the muscle every 30 days for 1 dose Next injection is due 11/4/22  Start taking on: November 4, 2022     nicotine polacrilex 4 MG gum  Commonly known as: NICORETTE  Take 1 each by mouth every 2 hours as needed for Smoking cessation            CONTINUE taking these medications      divalproex 500 MG DR tablet  Commonly known as: DEPAKOTE     levETIRAcetam 500 MG tablet  Commonly known as: Keppra  Take 1 tablet by mouth 2 times daily            STOP taking these medications      Abilify Maintena 400 MG Srer  Generic drug: ARIPiprazole ER  Replaced by: ARIPiprazole 15 MG tablet     buPROPion 150 MG extended release tablet  Commonly known as: WELLBUTRIN XL            ASK your doctor about these medications      Perseris 120 MG Prsy subcutaneous injection  Generic drug: risperiDONE ER     sertraline 50 MG tablet  Commonly known as: ZOLOFT     venlafaxine 100 MG tablet  Commonly known as: EFFEXOR               Where to Get Your Medications        These medications were sent to [facility] [address] [address]      Phone: [phone]   ARIPiprazole 15 MG tablet  ARIPiprazole lauroxil 662 MG/2.4ML Prsy injection       Information about where to get these medications is not yet available    Ask your nurse or doctor about these medications  nicotine polacrilex 4 MG gum       Patient is counseled if he continues to abuse drugs or alcohol he could act out impulsively causing serious harm to himself or others even though may be unintentional.  He demonstrated understanding of this and has the capacity understand this    Patient is counseled hisr mental health treatment will be difficult to optimize with ongoing use of drugs or alcohol he demonstrate understanding of this as the past understand this     Patient is counseled he must remain compliant with all medications outpatient follow-up ointments    Patient is discharged home in stable condition    TIME SPEND - 35 MINUTES TO COMPLETE THE EVALUATION, DISCHARGE SUMMARY, MEDICATION RECONCILIATION AND FOLLOW UP CARE     Signed:  SAADIA Gamez - CNP  41/6/6977  5:00 PM

## 2022-10-06 NOTE — CARE COORDINATION
Pt stated that he is feeling better and he is currently with friends \"smoking cigarettes\"  he is aware of follow up appts and has no further concerns.

## 2022-11-09 ASSESSMENT — ENCOUNTER SYMPTOMS
SORE THROAT: 0
ABDOMINAL PAIN: 0
EYE PAIN: 0
EYE REDNESS: 0
DIARRHEA: 0
BACK PAIN: 0
COUGH: 0
VOMITING: 0
EYE DISCHARGE: 0
SHORTNESS OF BREATH: 0
NAUSEA: 0
WHEEZING: 0
SINUS PRESSURE: 0

## 2023-06-16 ENCOUNTER — HOSPITAL ENCOUNTER (EMERGENCY)
Age: 23
Discharge: HOME | End: 2023-06-16
Attending: EMERGENCY MEDICINE
Payer: COMMERCIAL

## 2023-06-16 VITALS
OXYGEN SATURATION: 100 % | RESPIRATION RATE: 16 BRPM | DIASTOLIC BLOOD PRESSURE: 71 MMHG | BODY MASS INDEX: 35.36 KG/M2 | WEIGHT: 220 LBS | HEART RATE: 80 BPM | TEMPERATURE: 98.1 F | SYSTOLIC BLOOD PRESSURE: 137 MMHG | HEIGHT: 66 IN

## 2023-06-16 DIAGNOSIS — L25.5 RHUS DERMATITIS: Primary | ICD-10-CM

## 2023-06-16 DIAGNOSIS — M54.9 BACK PAIN, UNSPECIFIED BACK LOCATION, UNSPECIFIED BACK PAIN LATERALITY, UNSPECIFIED CHRONICITY: ICD-10-CM

## 2023-06-16 PROCEDURE — 6360000002 HC RX W HCPCS: Performed by: EMERGENCY MEDICINE

## 2023-06-16 PROCEDURE — 99284 EMERGENCY DEPT VISIT MOD MDM: CPT

## 2023-06-16 PROCEDURE — 96372 THER/PROPH/DIAG INJ SC/IM: CPT

## 2023-06-16 RX ORDER — NAPROXEN 500 MG/1
500 TABLET ORAL 2 TIMES DAILY
Qty: 14 TABLET | Refills: 0 | Status: SHIPPED | OUTPATIENT
Start: 2023-06-16 | End: 2023-06-23

## 2023-06-16 RX ORDER — DIAPER,BRIEF,INFANT-TODD,DISP
EACH MISCELLANEOUS
Qty: 30 G | Refills: 0 | Status: SHIPPED | OUTPATIENT
Start: 2023-06-16 | End: 2023-06-23

## 2023-06-16 RX ORDER — KETOROLAC TROMETHAMINE 30 MG/ML
30 INJECTION, SOLUTION INTRAMUSCULAR; INTRAVENOUS ONCE
Status: COMPLETED | OUTPATIENT
Start: 2023-06-16 | End: 2023-06-16

## 2023-06-16 RX ORDER — DEXAMETHASONE SODIUM PHOSPHATE 10 MG/ML
10 INJECTION, SOLUTION INTRAMUSCULAR; INTRAVENOUS ONCE
Status: COMPLETED | OUTPATIENT
Start: 2023-06-16 | End: 2023-06-16

## 2023-06-16 RX ADMIN — DEXAMETHASONE SODIUM PHOSPHATE 10 MG: 10 INJECTION, SOLUTION INTRAMUSCULAR; INTRAVENOUS at 09:11

## 2023-06-16 RX ADMIN — KETOROLAC TROMETHAMINE 30 MG: 30 INJECTION, SOLUTION INTRAMUSCULAR; INTRAVENOUS at 09:13

## 2023-06-16 NOTE — ED PROVIDER NOTES
HPI:  6/16/23,   Time: 9:14 AM EDT         Chary Landon is a 26 y.o. male presenting to the ED for chief complaint: Red itchy rash after exposure to poison ivy, beginning 1 week ago. The complaint has been persistent, moderate in severity, and worsened by nothing. Patient also mentions upper back pain after work. ROS:   Pertinent positives and negatives are stated within HPI, all other systems reviewed and are negative.  --------------------------------------------- PAST HISTORY ---------------------------------------------  Past Medical History:  has a past medical history of Autism, History of suicidal tendencies, OCD (obsessive compulsive disorder), and Seizures ([facility] [address] Past Surgical History:  has no past surgical history on file. Social History:  reports that he has been smoking cigarettes. He has been smoking an average of 1 pack per day. He has never used smokeless tobacco. He reports that he does not currently use alcohol. He reports that he does not use drugs. Family History: family history is not on file. The patients home medications have been reviewed. Allergies: Patient has no known allergies. -------------------------------------------------- RESULTS -------------------------------------------------  All laboratory and radiology results have been personally reviewed by myself   LABS:  No results found for this visit on 06/16/23. RADIOLOGY:  Interpreted by Radiologist.  No orders to display       ------------------------- NURSING NOTES AND VITALS REVIEWED ---------------------------   The nursing notes within the ED encounter and vital signs as below have been reviewed.    /71   Pulse 80   Temp 98.1 °F (36.7 °C) (Temporal)   Resp 16   Ht 5' 6\" (1.676 m)   Wt 220 lb (99.8 kg)   SpO2 100%   BMI 35.51 kg/m²   Oxygen Saturation Interpretation: Normal      ---------------------------------------------------PHYSICAL

## 2023-12-31 ENCOUNTER — HOSPITAL ENCOUNTER (EMERGENCY)
Age: 23
Discharge: HOME | End: 2023-12-31
Attending: STUDENT IN AN ORGANIZED HEALTH CARE EDUCATION/TRAINING PROGRAM
Payer: COMMERCIAL

## 2023-12-31 VITALS
OXYGEN SATURATION: 99 % | DIASTOLIC BLOOD PRESSURE: 98 MMHG | RESPIRATION RATE: 16 BRPM | SYSTOLIC BLOOD PRESSURE: 142 MMHG | TEMPERATURE: 98.8 F | BODY MASS INDEX: 38.09 KG/M2 | WEIGHT: 236 LBS | HEART RATE: 100 BPM

## 2023-12-31 DIAGNOSIS — J06.9 VIRAL URI: Primary | ICD-10-CM

## 2023-12-31 LAB
INFLUENZA A BY PCR: NOT DETECTED
INFLUENZA B BY PCR: NOT DETECTED
SARS-COV-2 RDRP RESP QL NAA+PROBE: NOT DETECTED
SPECIMEN DESCRIPTION: NORMAL

## 2023-12-31 PROCEDURE — 87635 SARS-COV-2 COVID-19 AMP PRB: CPT

## 2023-12-31 PROCEDURE — 99283 EMERGENCY DEPT VISIT LOW MDM: CPT

## 2023-12-31 PROCEDURE — 87502 INFLUENZA DNA AMP PROBE: CPT

## 2023-12-31 RX ORDER — ACETAMINOPHEN 500 MG
1000 TABLET ORAL ONCE
Status: DISCONTINUED | OUTPATIENT
Start: 2023-12-31 | End: 2023-12-31 | Stop reason: HOSPADM

## 2023-12-31 NOTE — ED PROVIDER NOTES
body aches, headache.  Patient hemodynamically stable, well-appearing.  COVID and influenza negative.  He is stable for discharge to outpatient follow-up.  Symptoms consistent with viral etiology at this time.  Patient counseled on supportive care and discharged home in stable condition.    Amount and/or Complexity of Data Reviewed  Labs: ordered.    Risk  OTC drugs.        Is this patient to be included in the SEP-1 core measure? No Exclusion criteria - the patient is NOT to be included for SEP-1 Core Measure due to: Viral etiology found or highly suspected (including COVID-19) without concomitant bacterial infection        ------------------------------------------ PROGRESS NOTES ------------------------------------------  I have spoken with the patient and discussed today’s results, in addition to providing specific details for the plan of care and counseling regarding the diagnosis and prognosis.  Their questions are answered at this time and they are agreeable with the plan. I discussed at length with them reasons for immediate return here for re evaluation. They will followup with PCP.      --------------------------------- ADDITIONAL PROVIDER NOTES ---------------------------------  At this time the patient is without objective evidence of an acute process requiring hospitalization or inpatient management.  They have remained hemodynamically stable throughout their entire ED visit and are stable for discharge with outpatient follow-up.     The plan has been discussed in detail and they are aware of the specific conditions for emergent return, as well as the importance of follow-up.      Discharge Medication List as of 12/31/2023 12:48 PM          Diagnosis:  1. Viral URI        Disposition:  Patient's disposition: Discharge to home  Patient's condition is stable.            Patti Corado MD  12/31/23 [id number]

## 2024-09-05 ENCOUNTER — APPOINTMENT (OUTPATIENT)
Dept: GENERAL RADIOLOGY | Age: 24
End: 2024-09-05
Payer: COMMERCIAL

## 2024-09-05 ENCOUNTER — HOSPITAL ENCOUNTER (EMERGENCY)
Age: 24
Discharge: HOME | End: 2024-09-05
Payer: COMMERCIAL

## 2024-09-05 VITALS
OXYGEN SATURATION: 97 % | WEIGHT: 244 LBS | DIASTOLIC BLOOD PRESSURE: 68 MMHG | RESPIRATION RATE: 18 BRPM | TEMPERATURE: 98.8 F | HEIGHT: 67 IN | BODY MASS INDEX: 38.3 KG/M2 | HEART RATE: 82 BPM | SYSTOLIC BLOOD PRESSURE: 125 MMHG

## 2024-09-05 DIAGNOSIS — U07.1 COVID-19: Primary | ICD-10-CM

## 2024-09-05 LAB
FLUAV RNA RESP QL NAA+PROBE: NOT DETECTED
FLUBV RNA RESP QL NAA+PROBE: NOT DETECTED
SARS-COV-2 RNA RESP QL NAA+PROBE: DETECTED
SOURCE: ABNORMAL
SPECIMEN DESCRIPTION: ABNORMAL
SPECIMEN SOURCE: NORMAL
STREP A, MOLECULAR: NEGATIVE

## 2024-09-05 PROCEDURE — 6360000002 HC RX W HCPCS

## 2024-09-05 PROCEDURE — 99284 EMERGENCY DEPT VISIT MOD MDM: CPT

## 2024-09-05 PROCEDURE — 2580000003 HC RX 258

## 2024-09-05 PROCEDURE — 87651 STREP A DNA AMP PROBE: CPT

## 2024-09-05 PROCEDURE — 96374 THER/PROPH/DIAG INJ IV PUSH: CPT

## 2024-09-05 PROCEDURE — 87636 SARSCOV2 & INF A&B AMP PRB: CPT

## 2024-09-05 PROCEDURE — 96375 TX/PRO/DX INJ NEW DRUG ADDON: CPT

## 2024-09-05 PROCEDURE — 71046 X-RAY EXAM CHEST 2 VIEWS: CPT

## 2024-09-05 RX ORDER — DIPHENHYDRAMINE HYDROCHLORIDE 50 MG/ML
25 INJECTION INTRAMUSCULAR; INTRAVENOUS ONCE
Status: COMPLETED | OUTPATIENT
Start: 2024-09-05 | End: 2024-09-05

## 2024-09-05 RX ORDER — METOCLOPRAMIDE HYDROCHLORIDE 5 MG/ML
10 INJECTION INTRAMUSCULAR; INTRAVENOUS ONCE
Status: COMPLETED | OUTPATIENT
Start: 2024-09-05 | End: 2024-09-05

## 2024-09-05 RX ORDER — 0.9 % SODIUM CHLORIDE 0.9 %
1000 INTRAVENOUS SOLUTION INTRAVENOUS ONCE
Status: COMPLETED | OUTPATIENT
Start: 2024-09-05 | End: 2024-09-05

## 2024-09-05 RX ORDER — KETOROLAC TROMETHAMINE 30 MG/ML
15 INJECTION, SOLUTION INTRAMUSCULAR; INTRAVENOUS ONCE
Status: COMPLETED | OUTPATIENT
Start: 2024-09-05 | End: 2024-09-05

## 2024-09-05 RX ADMIN — KETOROLAC TROMETHAMINE 15 MG: 30 INJECTION, SOLUTION INTRAMUSCULAR at 15:17

## 2024-09-05 RX ADMIN — SODIUM CHLORIDE 1000 ML: 9 INJECTION, SOLUTION INTRAVENOUS at 15:20

## 2024-09-05 RX ADMIN — METOCLOPRAMIDE 10 MG: 5 INJECTION, SOLUTION INTRAMUSCULAR; INTRAVENOUS at 15:17

## 2024-09-05 RX ADMIN — DIPHENHYDRAMINE HYDROCHLORIDE 25 MG: 50 INJECTION INTRAMUSCULAR; INTRAVENOUS at 15:17

## 2024-09-05 ASSESSMENT — LIFESTYLE VARIABLES: HOW OFTEN DO YOU HAVE A DRINK CONTAINING ALCOHOL: MONTHLY OR LESS

## 2024-09-05 ASSESSMENT — PAIN SCALES - GENERAL: PAINLEVEL_OUTOF10: 5

## 2024-09-05 ASSESSMENT — PAIN - FUNCTIONAL ASSESSMENT
PAIN_FUNCTIONAL_ASSESSMENT: NONE - DENIES PAIN
PAIN_FUNCTIONAL_ASSESSMENT: 0-10

## 2024-09-05 ASSESSMENT — PAIN DESCRIPTION - DESCRIPTORS: DESCRIPTORS: SORE

## 2024-09-05 ASSESSMENT — PAIN DESCRIPTION - LOCATION: LOCATION: THROAT

## 2024-09-05 ASSESSMENT — PAIN DESCRIPTION - PAIN TYPE: TYPE: ACUTE PAIN

## 2024-09-05 NOTE — ED PROVIDER NOTES
[facility] EMERGENCY DEPARTMENT  EMERGENCY DEPARTMENT ENCOUNTER        Pt Name: Ace Clarke  MRN: [id number]  Birthdate 2000  Date of evaluation: 9/5/2024  Provider: SAADIA Muñoz CNP  PCP: No primary care provider on file.  Note Started: 3:03 PM EDT 9/5/24      SRINIVAS. I have evaluated this patient.        CHIEF COMPLAINT       Chief Complaint   Patient presents with    Pharyngitis    Headache     X3 days       HISTORY OF PRESENT ILLNESS: 1 or more Elements     History from : Patient    Limitations to history : None    Ace Clarke is a 24 y.o. male who presents to the ED with complaints of pharyngitis, headache, cough, chills, and bodyaches for 3 days.  Patient states he does have a history of headaches and this headache feels the same as his usual headaches.  Patient denies any head injury.  Patient denies any recent sick contacts but states that he recently moved to an apartment and is around a lot of people.  Patient denies any chest pain, shortness of breath, abdominal pain, nausea, vomiting, diarrhea, fever.  Patient denies any other symptoms.  Patient has no other complaints at this time.    Nursing Notes were all reviewed and agreed with or any disagreements were addressed in the HPI.    REVIEW OF SYSTEMS :      Review of Systems   Constitutional:  Positive for chills.   HENT:  Positive for sore throat.    Eyes: Negative.    Respiratory:  Positive for cough.    Cardiovascular: Negative.    Gastrointestinal: Negative.    Endocrine: Negative.    Genitourinary: Negative.    Musculoskeletal:  Positive for myalgias.   Skin: Negative.    Allergic/Immunologic: Negative.    Neurological:  Positive for headaches.   Hematological: Negative.    Psychiatric/Behavioral: Negative.         Positives and Pertinent negatives as per HPI.     SURGICAL HISTORY   History reviewed. No pertinent surgical history.    CURRENTMEDICATIONS       Discharge Medication List as  an apartment and is around a lot of people.  Patient denies any chest pain, shortness of breath, abdominal pain, nausea, vomiting, diarrhea, fever.  Patient denies any other symptoms.  Patient has no other complaints at this time.  Differential diagnosis includes but is not limited to strep throat, COVID, pneumonia.  Rapid strep screen, COVID and influenza combo, chest x-ray obtained.  Patient received 1 L normal saline, Reglan, Benadryl, and Toradol.  Strep screen negative, influenza negative, COVID-19 positive.  Chest x-ray showed no acute consolidation or infiltrate.  1650-patient reports resolution of headache after receiving medications.  Results reviewed with patient.  Discussed diagnosis and plan to follow-up with PCP along with OTC Tylenol or ibuprofen for symptom management.  Patient advised to return to the ED if any new or worsening symptoms.  Patient verbalized understanding and is in agreement with the plan.       Disposition Considerations (include 1 Tests not done, Shared Decision Making, Pt Expectation of Test or Tx.): Based on HPI, PE, lab results, and imaging findings, no further testing is indicated at this time.  Diagnosis of COVID-19 is consistent with exam findings.  Appropriate for outpatient management follow-up with PCP along with the use of anti-inflammatories.    The emergency provider has spoken with patient and discussed today's results, in addition to providing specific details for the plan of care and counseling regarding the diagnosis and prognosis.  Patient instructed to follow-up with PCP.  Patient advised to return to ED if any new or worsening symptoms.  Patients questions were answered at this time and they were agreeable with plan.    I am the Primary Clinician of Record.    FINAL IMPRESSION      1. COVID-19          DISPOSITION/PLAN     DISPOSITION Decision To Discharge 09/05/2024 04:55:13 PM  Condition at Disposition: Stable      PATIENT REFERRED TO:  [facility]

## 2024-09-06 ASSESSMENT — ENCOUNTER SYMPTOMS
GASTROINTESTINAL NEGATIVE: 1
COUGH: 1
EYES NEGATIVE: 1
ALLERGIC/IMMUNOLOGIC NEGATIVE: 1
SORE THROAT: 1

## 2024-10-31 ENCOUNTER — HOSPITAL ENCOUNTER (EMERGENCY)
Age: 24
Discharge: HOME | End: 2024-11-01
Attending: EMERGENCY MEDICINE
Payer: COMMERCIAL

## 2024-10-31 DIAGNOSIS — F39 MOOD DISORDER (HCC): Primary | ICD-10-CM

## 2024-10-31 LAB
ALBUMIN SERPL-MCNC: 4.8 G/DL (ref 3.5–5.2)
ALP SERPL-CCNC: 95 U/L (ref 40–129)
ALT SERPL-CCNC: 123 U/L (ref 0–40)
AMPHET UR QL SCN: NEGATIVE
ANION GAP SERPL CALCULATED.3IONS-SCNC: 12 MMOL/L (ref 7–16)
APAP SERPL-MCNC: <5 UG/ML (ref 10–30)
AST SERPL-CCNC: 54 U/L (ref 0–39)
BARBITURATES UR QL SCN: NEGATIVE
BASOPHILS # BLD: 0.04 K/UL (ref 0–0.2)
BASOPHILS NFR BLD: 1 % (ref 0–2)
BENZODIAZ UR QL: NEGATIVE
BILIRUB SERPL-MCNC: <0.2 MG/DL (ref 0–1.2)
BUN SERPL-MCNC: 12 MG/DL (ref 6–20)
BUPRENORPHINE UR QL: NEGATIVE
CALCIUM SERPL-MCNC: 9.5 MG/DL (ref 8.6–10.2)
CANNABINOIDS UR QL SCN: NEGATIVE
CHLORIDE SERPL-SCNC: 109 MMOL/L (ref 98–107)
CO2 SERPL-SCNC: 23 MMOL/L (ref 22–29)
COCAINE UR QL SCN: NEGATIVE
CREAT SERPL-MCNC: 0.8 MG/DL (ref 0.7–1.2)
EOSINOPHIL # BLD: 0.19 K/UL (ref 0.05–0.5)
EOSINOPHILS RELATIVE PERCENT: 2 % (ref 0–6)
ERYTHROCYTE [DISTWIDTH] IN BLOOD BY AUTOMATED COUNT: 12.2 % (ref 11.5–15)
ETHANOLAMINE SERPL-MCNC: <10 MG/DL (ref 0–0.08)
FENTANYL UR QL: NEGATIVE
GFR, ESTIMATED: >90 ML/MIN/1.73M2
GLUCOSE SERPL-MCNC: 141 MG/DL (ref 74–99)
HCT VFR BLD AUTO: 43.9 % (ref 37–54)
HGB BLD-MCNC: 14.5 G/DL (ref 12.5–16.5)
IMM GRANULOCYTES # BLD AUTO: 0.04 K/UL (ref 0–0.58)
IMM GRANULOCYTES NFR BLD: 1 % (ref 0–5)
LYMPHOCYTES NFR BLD: 2.97 K/UL (ref 1.5–4)
LYMPHOCYTES RELATIVE PERCENT: 37 % (ref 20–42)
MCH RBC QN AUTO: 27.7 PG (ref 26–35)
MCHC RBC AUTO-ENTMCNC: 33 G/DL (ref 32–34.5)
MCV RBC AUTO: 83.8 FL (ref 80–99.9)
METHADONE UR QL: NEGATIVE
MONOCYTES NFR BLD: 0.9 K/UL (ref 0.1–0.95)
MONOCYTES NFR BLD: 11 % (ref 2–12)
NEUTROPHILS NFR BLD: 49 % (ref 43–80)
NEUTS SEG NFR BLD: 3.92 K/UL (ref 1.8–7.3)
OPIATES UR QL SCN: NEGATIVE
OXYCODONE UR QL SCN: NEGATIVE
PCP UR QL SCN: NEGATIVE
PLATELET # BLD AUTO: 279 K/UL (ref 130–450)
PMV BLD AUTO: 9.6 FL (ref 7–12)
POTASSIUM SERPL-SCNC: 4.3 MMOL/L (ref 3.5–5)
PROT SERPL-MCNC: 7.4 G/DL (ref 6.4–8.3)
RBC # BLD AUTO: 5.24 M/UL (ref 3.8–5.8)
SALICYLATES SERPL-MCNC: <0.3 MG/DL (ref 0–30)
SODIUM SERPL-SCNC: 144 MMOL/L (ref 132–146)
TEST INFORMATION: NORMAL
TOXIC TRICYCLIC SC,BLOOD: NEGATIVE
WBC OTHER # BLD: 8.1 K/UL (ref 4.5–11.5)

## 2024-10-31 PROCEDURE — 85025 COMPLETE CBC W/AUTO DIFF WBC: CPT

## 2024-10-31 PROCEDURE — 90791 PSYCH DIAGNOSTIC EVALUATION: CPT | Performed by: SOCIAL WORKER

## 2024-10-31 PROCEDURE — 93005 ELECTROCARDIOGRAM TRACING: CPT | Performed by: EMERGENCY MEDICINE

## 2024-10-31 PROCEDURE — G0480 DRUG TEST DEF 1-7 CLASSES: HCPCS

## 2024-10-31 PROCEDURE — 80307 DRUG TEST PRSMV CHEM ANLYZR: CPT

## 2024-10-31 PROCEDURE — 99284 EMERGENCY DEPT VISIT MOD MDM: CPT

## 2024-10-31 PROCEDURE — 80179 DRUG ASSAY SALICYLATE: CPT

## 2024-10-31 PROCEDURE — 80143 DRUG ASSAY ACETAMINOPHEN: CPT

## 2024-10-31 PROCEDURE — 80053 COMPREHEN METABOLIC PANEL: CPT

## 2024-10-31 ASSESSMENT — LIFESTYLE VARIABLES
HOW OFTEN DO YOU HAVE A DRINK CONTAINING ALCOHOL: MONTHLY OR LESS
HOW MANY STANDARD DRINKS CONTAINING ALCOHOL DO YOU HAVE ON A TYPICAL DAY: 3 OR 4

## 2024-11-01 VITALS
DIASTOLIC BLOOD PRESSURE: 88 MMHG | RESPIRATION RATE: 18 BRPM | WEIGHT: 230 LBS | BODY MASS INDEX: 36.02 KG/M2 | TEMPERATURE: 98.6 F | SYSTOLIC BLOOD PRESSURE: 128 MMHG | HEART RATE: 95 BPM | OXYGEN SATURATION: 95 %

## 2024-11-01 LAB
EKG ATRIAL RATE: 108 BPM
EKG P AXIS: 37 DEGREES
EKG P-R INTERVAL: 128 MS
EKG Q-T INTERVAL: 304 MS
EKG QRS DURATION: 84 MS
EKG QTC CALCULATION (BAZETT): 407 MS
EKG R AXIS: 82 DEGREES
EKG T AXIS: 28 DEGREES
EKG VENTRICULAR RATE: 108 BPM

## 2024-11-01 PROCEDURE — 93010 ELECTROCARDIOGRAM REPORT: CPT | Performed by: INTERNAL MEDICINE

## 2024-11-01 ASSESSMENT — PAIN - FUNCTIONAL ASSESSMENT
PAIN_FUNCTIONAL_ASSESSMENT: NONE - DENIES PAIN
PAIN_FUNCTIONAL_ASSESSMENT: NONE - DENIES PAIN

## 2024-11-01 NOTE — ED NOTES
Report given to TONO Walls from ED.   Report method in person   The following was reviewed with receiving RN:   Current vital signs:  /83   Pulse (!) 105   Temp 98.6 °F (37 °C) (Oral)   Resp 16   Wt 104.3 kg (230 lb)   SpO2 95%   BMI 36.02 kg/m²                MEWS Score: 2     Any medication or safety alerts were reviewed. Any pending diagnostics and notifications were also reviewed, as well as any safety concerns or issues, abnormal labs, abnormal imaging, and abnormal assessment findings. Questions were answered.
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
## 2024-11-01 NOTE — ED NOTES
Dr. Roberts at bedside with patient. Patient expresses to MD that he is not suicidal, he does not want to harm himself. He wanted someone to talk to and his friend was busy at work. Patient states he has an appointment with his counselor tomorrow morning. Patient encouraged to maintain follow up with counselor.